# Patient Record
Sex: FEMALE | Race: WHITE | NOT HISPANIC OR LATINO | Employment: FULL TIME | URBAN - METROPOLITAN AREA
[De-identification: names, ages, dates, MRNs, and addresses within clinical notes are randomized per-mention and may not be internally consistent; named-entity substitution may affect disease eponyms.]

---

## 2017-01-24 DIAGNOSIS — Z30.9 ENCOUNTER FOR CONTRACEPTIVE MANAGEMENT, UNSPECIFIED CONTRACEPTIVE ENCOUNTER TYPE: ICD-10-CM

## 2017-01-24 RX ORDER — ESTRADIOL 1 MG/1
TABLET ORAL
Qty: 28 TABLET | Refills: 0 | Status: SHIPPED | OUTPATIENT
Start: 2017-01-24 | End: 2017-02-23 | Stop reason: SDUPTHER

## 2017-02-23 DIAGNOSIS — Z30.9 ENCOUNTER FOR CONTRACEPTIVE MANAGEMENT, UNSPECIFIED CONTRACEPTIVE ENCOUNTER TYPE: ICD-10-CM

## 2017-02-24 RX ORDER — ESTRADIOL 1 MG/1
TABLET ORAL
Qty: 28 TABLET | Refills: 0 | Status: SHIPPED | OUTPATIENT
Start: 2017-02-24 | End: 2017-03-08 | Stop reason: SDUPTHER

## 2017-03-08 ENCOUNTER — OFFICE VISIT (OUTPATIENT)
Dept: OBSTETRICS AND GYNECOLOGY | Facility: CLINIC | Age: 62
End: 2017-03-08
Payer: COMMERCIAL

## 2017-03-08 VITALS
BODY MASS INDEX: 41.9 KG/M2 | DIASTOLIC BLOOD PRESSURE: 84 MMHG | SYSTOLIC BLOOD PRESSURE: 124 MMHG | WEIGHT: 260.69 LBS | HEIGHT: 66 IN

## 2017-03-08 DIAGNOSIS — Z78.0 MENOPAUSE: ICD-10-CM

## 2017-03-08 DIAGNOSIS — Z30.9 ENCOUNTER FOR CONTRACEPTIVE MANAGEMENT, UNSPECIFIED CONTRACEPTIVE ENCOUNTER TYPE: ICD-10-CM

## 2017-03-08 DIAGNOSIS — R82.90 BAD ODOR OF URINE: ICD-10-CM

## 2017-03-08 DIAGNOSIS — Z12.31 VISIT FOR SCREENING MAMMOGRAM: ICD-10-CM

## 2017-03-08 DIAGNOSIS — Z01.419 ENCOUNTER FOR GYNECOLOGICAL EXAMINATION: Primary | ICD-10-CM

## 2017-03-08 LAB
BILIRUB SERPL-MCNC: NORMAL MG/DL
BLOOD URINE, POC: NORMAL
COLOR, POC UA: NORMAL
GLUCOSE UR QL STRIP: NORMAL
KETONES UR QL STRIP: NORMAL
LEUKOCYTE ESTERASE URINE, POC: NORMAL
NITRITE, POC UA: NORMAL
PH, POC UA: 6
PROTEIN, POC: NORMAL
SPECIFIC GRAVITY, POC UA: 1
UROBILINOGEN, POC UA: NORMAL

## 2017-03-08 PROCEDURE — 99999 PR PBB SHADOW E&M-EST. PATIENT-LVL III: CPT | Mod: PBBFAC,,, | Performed by: OBSTETRICS & GYNECOLOGY

## 2017-03-08 PROCEDURE — 87086 URINE CULTURE/COLONY COUNT: CPT

## 2017-03-08 PROCEDURE — 99396 PREV VISIT EST AGE 40-64: CPT | Mod: 25,S$GLB,, | Performed by: OBSTETRICS & GYNECOLOGY

## 2017-03-08 PROCEDURE — 81002 URINALYSIS NONAUTO W/O SCOPE: CPT | Mod: S$GLB,,, | Performed by: OBSTETRICS & GYNECOLOGY

## 2017-03-08 RX ORDER — ESTRADIOL 1 MG/1
1 TABLET ORAL DAILY
Qty: 84 TABLET | Refills: 3 | Status: SHIPPED | OUTPATIENT
Start: 2017-03-08 | End: 2018-02-23 | Stop reason: SDUPTHER

## 2017-03-08 RX ORDER — FUROSEMIDE 20 MG/1
TABLET ORAL
Refills: 0 | COMMUNITY
Start: 2017-01-10

## 2017-03-08 RX ORDER — POTASSIUM CHLORIDE 750 MG/1
CAPSULE, EXTENDED RELEASE ORAL
Refills: 0 | COMMUNITY
Start: 2017-01-16

## 2017-03-08 RX ORDER — ALBUTEROL SULFATE 90 UG/1
AEROSOL, METERED RESPIRATORY (INHALATION)
Refills: 0 | COMMUNITY
Start: 2016-12-27 | End: 2020-11-04

## 2017-03-08 RX ORDER — LOSARTAN POTASSIUM 50 MG/1
50 TABLET ORAL DAILY
Refills: 0 | COMMUNITY
Start: 2016-12-27 | End: 2018-03-21

## 2017-03-08 RX ORDER — ESTRADIOL 1 MG/1
TABLET ORAL
COMMUNITY
Start: 2016-01-05 | End: 2017-03-08 | Stop reason: SDUPTHER

## 2017-03-08 RX ORDER — LEVOTHYROXINE SODIUM 100 UG/1
TABLET ORAL
COMMUNITY
Start: 2016-01-26 | End: 2020-11-04

## 2017-03-08 RX ORDER — TRAZODONE HYDROCHLORIDE 50 MG/1
TABLET ORAL
Refills: 0 | COMMUNITY
Start: 2017-01-10 | End: 2022-02-08

## 2017-03-08 RX ORDER — ATORVASTATIN CALCIUM 20 MG/1
20 TABLET, FILM COATED ORAL NIGHTLY
Refills: 0 | COMMUNITY
Start: 2017-01-10 | End: 2022-02-08

## 2017-03-08 RX ORDER — BUDESONIDE AND FORMOTEROL FUMARATE DIHYDRATE 160; 4.5 UG/1; UG/1
AEROSOL RESPIRATORY (INHALATION)
Refills: 1 | COMMUNITY
Start: 2017-02-20 | End: 2019-09-18 | Stop reason: SDUPTHER

## 2017-03-08 RX ORDER — LORAZEPAM 1 MG/1
TABLET ORAL
Refills: 0 | COMMUNITY
Start: 2017-02-23

## 2017-03-08 RX ORDER — PANTOPRAZOLE SODIUM 40 MG/1
40 TABLET, DELAYED RELEASE ORAL DAILY
Refills: 0 | COMMUNITY
Start: 2017-02-23

## 2017-03-08 RX ORDER — ESCITALOPRAM OXALATE 10 MG/1
10 TABLET ORAL NIGHTLY
Refills: 0 | COMMUNITY
Start: 2017-02-20 | End: 2022-02-08

## 2017-03-08 RX ORDER — MONTELUKAST SODIUM 10 MG/1
10 TABLET ORAL DAILY
Refills: 0 | COMMUNITY
Start: 2017-01-30

## 2017-03-08 NOTE — PROGRESS NOTES
Subjective:       Patient ID: Madhavi Berg is a 61 y.o. female.    Chief Complaint:  Well Woman (last pap and HPV negative 11-3-14, TVH, last mammogram wnl 16, last DEXA osteopenia 13, pt c/o strong odor in urine but denies burning with urination)      History of Present Illness.  Madhavi Berg is a 61 y.o. female.  She has no breast symptoms, postcoital bleeding, pelvic pain or vaginal discharge.  She complains of malodorous urine with LOF with valsalva.  She is getting ready to have left knee surgery again    GYN & OB History  No LMP recorded. Patient has had a hysterectomy.   Pap:  Normal HPV negative  Mammogram: 16 Normal  Colonoscopy:  Normal  DEXA:  hip -1.16 spine normal    OB History    Para Term  AB SAB TAB Ectopic Multiple Living   3 3 3       3      # Outcome Date GA Lbr Rex/2nd Weight Sex Delivery Anes PTL Lv   3 Term 81 40w0d  4.082 kg (9 lb) M Vag-Spont   Y   2 Term 06/10/77 40w0d  3.856 kg (8 lb 8 oz) M Vag-Spont   Y   1 Term 72 40w0d  3.969 kg (8 lb 12 oz) M Vag-Spont   Y          Past Medical History:   Diagnosis Date    Esophageal reflux     Fibrocystic breast disease     History of anxiety disorder     Hyperlipidemia     IBS (irritable bowel syndrome)     Osteopenia      Past Surgical History:   Procedure Laterality Date    BREAST LUMPECTOMY  5238-2619    1 lump from right and 2 lumps from left breast, benign     dexa      osteopenia    HYSTERECTOMY      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    NECK SURGERY  1985    partial thyroidectomy    OOPHORECTOMY  1981    ORTHOPEDIC SURGERY  6714-7951    right foot x 1 and left foot x 3    TONSILLECTOMY      9years old     Family History   Problem Relation Age of Onset    Throat cancer Father     Hypertension Father     Colon cancer Mother     Lung cancer Sister     Breast cancer Neg Hx     Ovarian cancer Neg Hx     Stroke Neg Hx      Social History    Substance Use Topics    Smoking status: Former Smoker    Smokeless tobacco: None    Alcohol use Yes      Comment: occasional       Current Outpatient Prescriptions:     atorvastatin (LIPITOR) 20 MG tablet, Take 20 mg by mouth every evening., Disp: , Rfl: 0    escitalopram oxalate (LEXAPRO) 10 MG tablet, Take 10 mg by mouth every evening., Disp: , Rfl: 0    estradiol (ESTRACE) 1 MG tablet, Take 1 tablet (1 mg total) by mouth once daily., Disp: 84 tablet, Rfl: 3    furosemide (LASIX) 20 MG tablet, , Disp: , Rfl: 0    levothyroxine (SYNTHROID) 100 MCG tablet, , Disp: , Rfl:     lorazepam (ATIVAN) 1 MG tablet, take 1/2 to 1 tablet by mouth at bedtime if needed, Disp: , Rfl: 0    losartan (COZAAR) 50 MG tablet, Take 50 mg by mouth once daily., Disp: , Rfl: 0    montelukast (SINGULAIR) 10 mg tablet, Take 10 mg by mouth once daily., Disp: , Rfl: 0    multivit-min-FA-Ca carb-vit K (ONE-A-DAY WOMEN'S 50 PLUS) 400 mcg-500 mg calcium-20 mcg Tab, , Disp: , Rfl:     omega-3 fatty acids (FISH OIL) 300 mg Cap, once a day, Disp: , Rfl:     pantoprazole (PROTONIX) 40 MG tablet, Take 40 mg by mouth once daily., Disp: , Rfl: 0    potassium chloride (MICRO-K) 10 MEQ CpSR, , Disp: , Rfl: 0    PROAIR HFA 90 mcg/actuation inhaler, inhale 1 to 2 puffs every 4 to 6 hours if needed, Disp: , Rfl: 0    SYMBICORT 160-4.5 mcg/actuation HFAA, , Disp: , Rfl: 1    trazodone (DESYREL) 50 MG tablet, take 1 to 2 tablets by mouth at bedtime, Disp: , Rfl: 0    Review of patient's allergies indicates:  Allergies not on file    Review of Systems  Review of Systems   Constitutional: Negative for fatigue.   HENT: Negative for trouble swallowing.    Eyes: Negative for visual disturbance.   Respiratory: Negative for cough and shortness of breath.    Cardiovascular: Negative for chest pain.   Gastrointestinal: Negative for abdominal distention, abdominal pain, blood in stool, nausea and vomiting.   Genitourinary: Negative for difficulty  "urinating, dyspareunia, dysuria, flank pain, frequency, hematuria, pelvic pain, urgency, vaginal bleeding, vaginal discharge and vaginal pain.   Musculoskeletal: Negative for arthralgias.   Skin: Negative for rash.   Neurological: Negative for dizziness and headaches.   Psychiatric/Behavioral: Negative for sleep disturbance. The patient is not nervous/anxious.         Objective:     Vitals:    03/08/17 1448   BP: 124/84   Weight: 118.2 kg (260 lb 11.1 oz)   Height: 5' 6" (1.676 m)   PainSc:   8     Body mass index is 42.08 kg/(m^2).    Physical Exam:   Constitutional: She is oriented to person, place, and time. Vital signs are normal. She appears well-developed and well-nourished.    HENT:   Head: Normocephalic.     Neck: Normal range of motion. No thyromegaly present.     Pulmonary/Chest: Right breast exhibits no mass, no nipple discharge, no skin change, no tenderness and no swelling. Left breast exhibits no mass, no nipple discharge, no skin change, no tenderness and no swelling. Breasts are symmetrical.        Abdominal: Soft. Normal appearance and bowel sounds are normal. She exhibits no distension. There is no tenderness.     Genitourinary: Rectum normal and vagina normal. Rectal exam shows guaiac negative stool. Guaiac negative stool. Pelvic exam was performed with patient supine. There is no rash, tenderness, lesion or injury on the right labia. There is no rash, tenderness, lesion or injury on the left labia. Uterus is absent. Right adnexum displays no mass, no tenderness and no fullness. Left adnexum displays no mass, no tenderness and no fullness. No erythema in the vagina. No vaginal discharge found. Cervix exhibits absence.           Musculoskeletal: Normal range of motion.      Lymphadenopathy:        Right: No inguinal and no supraclavicular adenopathy present.        Left: No inguinal and no supraclavicular adenopathy present.    Neurological: She is alert and oriented to person, place, and time.  "   Skin: Skin is warm and dry.    Psychiatric: She has a normal mood and affect.        Assessment/ Plan:     Encounter for gynecological examination    Encounter for contraceptive management, unspecified contraceptive encounter type  -     estradiol (ESTRACE) 1 MG tablet; Take 1 tablet (1 mg total) by mouth once daily.  Dispense: 84 tablet; Refill: 3    Visit for screening mammogram  -     Mammo Digital Screening Bilat with Tomosynthesis CAD; Future; Expected date: 3/8/17    Menopause  -     DXA Bone Density Spine And Hip_Axial Skeleton; Future; Expected date: 3/8/17    Bad odor of urine  -     POCT urine dipstick without microscope        Routine pap smears.  Self breast exam and mammography discussed  Routine colonoscopy discussed.  Diet and exercise discussed at length.  I recommend Medi-Weightloss and explained how it will help her knee issues too.  Recommend calcium 1200 mg and vitamin D 600 units daily and routine bone mineral density testing.  Yearly influenza vaccination discussed.      Follow-up with me in 1 year

## 2017-03-09 LAB — BACTERIA UR CULT: NO GROWTH

## 2017-03-15 ENCOUNTER — TELEPHONE (OUTPATIENT)
Dept: OBSTETRICS AND GYNECOLOGY | Facility: CLINIC | Age: 62
End: 2017-03-15

## 2017-03-15 NOTE — TELEPHONE ENCOUNTER
----- Message from Queta Siegel MD sent at 3/13/2017  9:21 AM CDT -----  Call pt.  Urine culture is normal

## 2017-04-20 ENCOUNTER — HOSPITAL ENCOUNTER (OUTPATIENT)
Dept: RADIOLOGY | Facility: HOSPITAL | Age: 62
Discharge: HOME | End: 2017-04-20
Attending: OBSTETRICS & GYNECOLOGY
Payer: COMMERCIAL

## 2017-04-20 DIAGNOSIS — Z12.31 VISIT FOR SCREENING MAMMOGRAM: ICD-10-CM

## 2017-04-20 PROCEDURE — 77067 SCR MAMMO BI INCL CAD: CPT | Mod: 26,,, | Performed by: RADIOLOGY

## 2017-04-20 PROCEDURE — 77063 BREAST TOMOSYNTHESIS BI: CPT | Mod: 26,,, | Performed by: RADIOLOGY

## 2017-04-20 PROCEDURE — 77067 SCR MAMMO BI INCL CAD: CPT | Mod: TC

## 2017-04-24 ENCOUNTER — TELEPHONE (OUTPATIENT)
Dept: OBSTETRICS AND GYNECOLOGY | Facility: CLINIC | Age: 62
End: 2017-04-24

## 2017-04-24 NOTE — TELEPHONE ENCOUNTER
Called pt and left detailed message that per Dr. Siegel her mammogram was normal and she will repeat it in one year.    ----- Message from Queta Siegel MD sent at 4/24/2017  9:23 AM CDT -----  Call patient.  Mammogram normal.  We will repeat it in 1 year.

## 2017-04-26 ENCOUNTER — TELEPHONE (OUTPATIENT)
Dept: OBSTETRICS AND GYNECOLOGY | Facility: CLINIC | Age: 62
End: 2017-04-26

## 2017-04-26 NOTE — TELEPHONE ENCOUNTER
----- Message from Queta Siegel MD sent at 4/26/2017  9:21 AM CDT -----  Call patient.  Bone density test is normal.  Participate in weight-bearing exercise 2-3 times / week.  Take calcium 1200 mg daily and Vitamin D 600 units daily unless your primary care physician has instructed you not to do so.  Repeat your bone density test in 3-5 years.

## 2017-04-26 NOTE — TELEPHONE ENCOUNTER
Pt returned my call and she was notified of normal Bone Density Results. Per Dr Siegel, she Recommends calcium and vit D. Pt stated she is already on a high dose of Vit D already for a low Vit D level.

## 2018-02-23 DIAGNOSIS — Z30.9 ENCOUNTER FOR CONTRACEPTIVE MANAGEMENT: ICD-10-CM

## 2018-02-26 RX ORDER — ESTRADIOL 1 MG/1
TABLET ORAL
Qty: 84 TABLET | Refills: 0 | Status: SHIPPED | OUTPATIENT
Start: 2018-02-26 | End: 2018-03-21

## 2018-03-21 ENCOUNTER — TELEPHONE (OUTPATIENT)
Dept: OBSTETRICS AND GYNECOLOGY | Facility: CLINIC | Age: 63
End: 2018-03-21

## 2018-03-21 ENCOUNTER — OFFICE VISIT (OUTPATIENT)
Dept: OBSTETRICS AND GYNECOLOGY | Facility: CLINIC | Age: 63
End: 2018-03-21
Payer: COMMERCIAL

## 2018-03-21 VITALS
DIASTOLIC BLOOD PRESSURE: 80 MMHG | BODY MASS INDEX: 41.22 KG/M2 | HEIGHT: 66 IN | WEIGHT: 256.5 LBS | SYSTOLIC BLOOD PRESSURE: 136 MMHG

## 2018-03-21 DIAGNOSIS — Z12.31 VISIT FOR SCREENING MAMMOGRAM: ICD-10-CM

## 2018-03-21 DIAGNOSIS — Z01.411 ENCOUNTER FOR GYNECOLOGICAL EXAMINATION (GENERAL) (ROUTINE) WITH ABNORMAL FINDINGS: Primary | ICD-10-CM

## 2018-03-21 DIAGNOSIS — Z78.0 MENOPAUSE: ICD-10-CM

## 2018-03-21 PROCEDURE — 99396 PREV VISIT EST AGE 40-64: CPT | Mod: S$GLB,,, | Performed by: OBSTETRICS & GYNECOLOGY

## 2018-03-21 PROCEDURE — 99999 PR PBB SHADOW E&M-EST. PATIENT-LVL III: CPT | Mod: PBBFAC,,, | Performed by: OBSTETRICS & GYNECOLOGY

## 2018-03-21 RX ORDER — LOSARTAN POTASSIUM 100 MG/1
TABLET ORAL
COMMUNITY
Start: 2018-02-19 | End: 2019-09-18 | Stop reason: SDUPTHER

## 2018-03-21 RX ORDER — CELECOXIB 200 MG/1
CAPSULE ORAL
COMMUNITY
Start: 2017-12-12 | End: 2019-09-18

## 2018-03-21 RX ORDER — GABAPENTIN 300 MG/1
CAPSULE ORAL
COMMUNITY
Start: 2017-12-12 | End: 2019-09-18

## 2018-03-21 RX ORDER — PROGESTERONE 200 MG/1
200 CAPSULE ORAL DAILY
Qty: 90 CAPSULE | Refills: 3 | Status: SHIPPED | OUTPATIENT
Start: 2018-03-21 | End: 2019-09-18

## 2018-03-21 RX ORDER — ESTRADIOL 2 MG/1
2 TABLET ORAL DAILY
Qty: 90 TABLET | Refills: 3 | Status: SHIPPED | OUTPATIENT
Start: 2018-03-21 | End: 2019-04-15 | Stop reason: SDUPTHER

## 2018-03-21 NOTE — TELEPHONE ENCOUNTER
Pt was seen in office today and was prescribed Progesterone (PROMETRIUM) 200 MG capsule. Pt states that her pharmacy called her to inform her that it would cost her $223 due to her deductible. Pt wants to know if there is a generic or cheaper alternative or if something else can be recommended.

## 2018-03-21 NOTE — TELEPHONE ENCOUNTER
Pt says progesterone that Dr Swing prescribed today is too expensive and wants to know if there is a generic or cheaper alternative.

## 2018-03-21 NOTE — PROGRESS NOTES
Subjective:       Patient ID: Madhavi Berg is a 62 y.o. female.    Chief Complaint:  Well Woman (Annual Exam  --  Last Pap/Hpv 11-3-14 (TVH), Negative  --  Last MMG 17, Normal   --  Colonosocpy , Normal   --  Dexa 17, Osteopenia )      History of Present Illness.  Madhvai Berg is a 62 y.o. female.  She has no breast or urinary symptoms.  She has no postcoital bleeding, pelvic pain or vaginal discharge.  She recently had left knee replacement.  Her right shoulder is also having issues and she may need it replaced.  She also complains of memory issues.    GYN & OB History  No LMP recorded (lmp unknown). Patient has had a hysterectomy.   Pap: No result found  Mammogram: 2017 Normal  Colonoscopy:  Normal  DEXA:  Osteopenia    OB History    Para Term  AB Living   3 3 3     3   SAB TAB Ectopic Multiple Live Births           3      # Outcome Date GA Lbr Rex/2nd Weight Sex Delivery Anes PTL Lv   3 Term 81 40w0d  4.082 kg (9 lb) M Vag-Spont EPI  NOY   2 Term 06/10/77 40w0d  3.856 kg (8 lb 8 oz) M Vag-Spont EPI  NOY   1 Term 73 40w0d  3.969 kg (8 lb 12 oz) M Vag-Spont Gen  NOY      Obstetric Comments   Age at menarche 15       Past Medical History:   Diagnosis Date    Esophageal reflux     Fibrocystic breast disease     History of anxiety disorder     History of bone density study 2017    Osteopenia  (7-30-1, Osteopenia)     Hyperlipidemia     IBS (irritable bowel syndrome)     Osteopenia      Past Surgical History:   Procedure Laterality Date    APPENDECTOMY      BREAST LUMPECTOMY   &     1 lump from right and 2 lumps from left breast, benign     COLONOSCOPY      Diverticulitis  ( Dr Breaux - Q 3yrs)    HYSTERECTOMY      TVH - 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    OOPHORECTOMY Right 1981    ORTHOPEDIC SURGERY  [id number]    right foot x 1 and left foot x 3    THYROIDECTOMY, PARTIAL  1985    Dr Alfie Lennon    TONSILLECTOMY       9years old     Family History   Problem Relation Age of Onset    Throat cancer Father     Hypertension Father     Cancer Father     Colon cancer Mother     Cancer Mother     Lung cancer Sister     Cancer Sister     Breast cancer Neg Hx     Ovarian cancer Neg Hx     Stroke Neg Hx      Social History   Substance Use Topics    Smoking status: Former Smoker     Quit date: 1994    Smokeless tobacco: Never Used    Alcohol use Yes      Comment: occasional       Current Outpatient Prescriptions:     atorvastatin (LIPITOR) 20 MG tablet, Take 20 mg by mouth every evening., Disp: , Rfl: 0    celecoxib (CELEBREX) 200 MG capsule, , Disp: , Rfl:     escitalopram oxalate (LEXAPRO) 10 MG tablet, Take 10 mg by mouth every evening., Disp: , Rfl: 0    furosemide (LASIX) 20 MG tablet, , Disp: , Rfl: 0    gabapentin (NEURONTIN) 300 MG capsule, , Disp: , Rfl:     levothyroxine (SYNTHROID) 100 MCG tablet, , Disp: , Rfl:     lorazepam (ATIVAN) 1 MG tablet, take 1/2 to 1 tablet by mouth at bedtime if needed, Disp: , Rfl: 0    losartan (COZAAR) 100 MG tablet, , Disp: , Rfl:     montelukast (SINGULAIR) 10 mg tablet, Take 10 mg by mouth once daily., Disp: , Rfl: 0    multivit-min-FA-Ca carb-vit K (ONE-A-DAY WOMEN'S 50 PLUS) 400 mcg-500 mg calcium-20 mcg Tab, , Disp: , Rfl:     pantoprazole (PROTONIX) 40 MG tablet, Take 40 mg by mouth once daily., Disp: , Rfl: 0    potassium chloride (MICRO-K) 10 MEQ CpSR, , Disp: , Rfl: 0    PROAIR HFA 90 mcg/actuation inhaler, inhale 1 to 2 puffs every 4 to 6 hours if needed, Disp: , Rfl: 0    SYMBICORT 160-4.5 mcg/actuation HFAA, , Disp: , Rfl: 1    trazodone (DESYREL) 50 MG tablet, take 1 to 2 tablets by mouth at bedtime, Disp: , Rfl: 0    estradiol (ESTRACE) 2 MG tablet, Take 1 tablet (2 mg total) by mouth once daily., Disp: 90 tablet, Rfl: 3    progesterone (PROMETRIUM) 200 MG capsule, Take 1 capsule (200 mg total) by mouth once daily., Disp: 90 capsule, Rfl: 3    Review  "of patient's allergies indicates:   Allergen Reactions    Iodine Hives and Rash    Sulfa (sulfonamide antibiotics) Hives and Rash       Review of Systems  Review of Systems   Constitutional: Negative for fatigue.   HENT: Negative for trouble swallowing.    Eyes: Negative for visual disturbance.   Respiratory: Negative for cough and shortness of breath.    Cardiovascular: Negative for chest pain.   Gastrointestinal: Negative for abdominal distention, abdominal pain, blood in stool, nausea and vomiting.   Genitourinary: Negative for difficulty urinating, dyspareunia, dysuria, flank pain, frequency, hematuria, pelvic pain, urgency, vaginal bleeding, vaginal discharge and vaginal pain.   Musculoskeletal: Negative for arthralgias.   Skin: Negative for rash.   Neurological: Negative for dizziness and headaches.   Psychiatric/Behavioral: Negative for sleep disturbance. The patient is not nervous/anxious.         Objective:     Vitals:    03/21/18 1345   BP: 136/80   Weight: 116.3 kg (256 lb 8.1 oz)   Height: 5' 6" (1.676 m)   PainSc: 0-No pain     Body mass index is 41.4 kg/m².    Physical Exam:   Constitutional: She is oriented to person, place, and time. Vital signs are normal. She appears well-developed and well-nourished.    HENT:   Head: Normocephalic.     Neck: Normal range of motion. No thyromegaly present.     Pulmonary/Chest: Right breast exhibits no mass, no nipple discharge, no skin change, no tenderness and no swelling. Left breast exhibits no mass, no nipple discharge, no skin change, no tenderness and no swelling. Breasts are symmetrical.        Abdominal: Soft. Normal appearance and bowel sounds are normal. She exhibits no distension. There is no tenderness.     Genitourinary: Rectum normal and vagina normal. Rectal exam shows guaiac negative stool. Guaiac negative stool. Pelvic exam was performed with patient supine. There is no rash, tenderness, lesion or injury on the right labia. There is no rash, " tenderness, lesion or injury on the left labia. Uterus is absent. Right adnexum displays no mass, no tenderness and no fullness. Left adnexum displays no mass, no tenderness and no fullness. No erythema in the vagina. No vaginal discharge found. Cervix exhibits absence.           Musculoskeletal: Normal range of motion.      Lymphadenopathy:        Right: No inguinal and no supraclavicular adenopathy present.        Left: No inguinal and no supraclavicular adenopathy present.    Neurological: She is alert and oriented to person, place, and time.    Skin: Skin is warm and dry.    Psychiatric: She has a normal mood and affect.        Assessment/ Plan:     Encounter for gynecological examination (general) (routine) with abnormal findings    Visit for screening mammogram  -     Mammo Digital Screening Bilat with Tomosynthesis CAD; Future; Expected date: 03/21/2018    BMI 40.0-44.9, adult    Menopause  -     estradiol (ESTRACE) 2 MG tablet; Take 1 tablet (2 mg total) by mouth once daily.  Dispense: 90 tablet; Refill: 3  -     progesterone (PROMETRIUM) 200 MG capsule; Take 1 capsule (200 mg total) by mouth once daily.  Dispense: 90 capsule; Refill: 3    BHRT discussed - risks/benefits.  Recommend DHEA 5 mg daily, titrate to 25 mg BID  Recommend pregnenolone  mg QD for memory.  Routine pap smears.  Self breast exam and mammography discussed  Routine colonoscopy discussed.  Diet and exercise discussed.  Recommend calcium 1200 mg and vitamin D 600 units daily and routine bone mineral density testing.  Yearly influenza vaccination discussed.  Follow-up with me in 1 year

## 2018-03-21 NOTE — TELEPHONE ENCOUNTER
Spoke with pt.  She will check [facility] and let me know.  If still too expensive, she wants compounded

## 2018-03-22 ENCOUNTER — TELEPHONE (OUTPATIENT)
Dept: OBSTETRICS AND GYNECOLOGY | Facility: CLINIC | Age: 63
End: 2018-03-22

## 2018-03-22 DIAGNOSIS — Z78.0 MENOPAUSE: ICD-10-CM

## 2018-03-22 RX ORDER — PROGESTERONE 200 MG/1
200 CAPSULE ORAL DAILY
Qty: 90 CAPSULE | Refills: 3 | Status: CANCELLED | OUTPATIENT
Start: 2018-03-22 | End: 2018-06-20

## 2018-03-22 NOTE — TELEPHONE ENCOUNTER
Informed pt that Rx was sent and they will contact her for additional information. Pt verbalized understanding.

## 2018-03-22 NOTE — TELEPHONE ENCOUNTER
Pt calling to let Dr Siegel know that her progesterone is still too expensive at [facility]. Pt would it called in to pharmacy that Dr Siegel suggested.

## 2018-04-25 ENCOUNTER — TELEPHONE (OUTPATIENT)
Dept: OBSTETRICS AND GYNECOLOGY | Facility: CLINIC | Age: 63
End: 2018-04-25

## 2018-04-25 ENCOUNTER — APPOINTMENT (OUTPATIENT)
Dept: RADIOLOGY | Facility: OTHER | Age: 63
End: 2018-04-25
Attending: OBSTETRICS & GYNECOLOGY
Payer: COMMERCIAL

## 2018-04-25 VITALS — BODY MASS INDEX: 41.14 KG/M2 | WEIGHT: 256 LBS | HEIGHT: 66 IN

## 2018-04-25 DIAGNOSIS — Z12.31 VISIT FOR SCREENING MAMMOGRAM: ICD-10-CM

## 2018-04-25 PROCEDURE — 77063 BREAST TOMOSYNTHESIS BI: CPT | Mod: 26,,, | Performed by: RADIOLOGY

## 2018-04-25 PROCEDURE — 77067 SCR MAMMO BI INCL CAD: CPT | Mod: TC,PN

## 2018-04-25 PROCEDURE — 77067 SCR MAMMO BI INCL CAD: CPT | Mod: 26,,, | Performed by: RADIOLOGY

## 2018-04-25 NOTE — TELEPHONE ENCOUNTER
----- Message from Queta Siegel MD sent at 4/25/2018  4:52 PM CDT -----  Call patient.  Mammogram normal.  We will repeat it in 1 year.

## 2019-04-15 DIAGNOSIS — Z78.0 MENOPAUSE: ICD-10-CM

## 2019-04-15 RX ORDER — ESTRADIOL 2 MG/1
TABLET ORAL
Qty: 90 TABLET | Refills: 0 | Status: SHIPPED | OUTPATIENT
Start: 2019-04-15 | End: 2019-07-15 | Stop reason: SDUPTHER

## 2019-05-23 ENCOUNTER — TELEPHONE (OUTPATIENT)
Dept: OBSTETRICS AND GYNECOLOGY | Facility: CLINIC | Age: 64
End: 2019-05-23

## 2019-05-23 DIAGNOSIS — Z12.31 ENCOUNTER FOR SCREENING MAMMOGRAM FOR BREAST CANCER: Primary | ICD-10-CM

## 2019-07-15 DIAGNOSIS — Z78.0 MENOPAUSE: ICD-10-CM

## 2019-07-15 RX ORDER — ESTRADIOL 2 MG/1
2 TABLET ORAL DAILY
Qty: 90 TABLET | Refills: 0 | Status: SHIPPED | OUTPATIENT
Start: 2019-07-15 | End: 2019-10-11 | Stop reason: SDUPTHER

## 2019-09-18 ENCOUNTER — TELEPHONE (OUTPATIENT)
Dept: OBSTETRICS AND GYNECOLOGY | Facility: CLINIC | Age: 64
End: 2019-09-18

## 2019-09-18 ENCOUNTER — APPOINTMENT (OUTPATIENT)
Dept: RADIOLOGY | Facility: OTHER | Age: 64
End: 2019-09-18
Attending: OBSTETRICS & GYNECOLOGY
Payer: MEDICARE

## 2019-09-18 ENCOUNTER — OFFICE VISIT (OUTPATIENT)
Dept: OBSTETRICS AND GYNECOLOGY | Facility: CLINIC | Age: 64
End: 2019-09-18
Payer: MEDICARE

## 2019-09-18 VITALS
WEIGHT: 256 LBS | DIASTOLIC BLOOD PRESSURE: 86 MMHG | BODY MASS INDEX: 41.14 KG/M2 | HEIGHT: 66 IN | HEIGHT: 66 IN | BODY MASS INDEX: 43.49 KG/M2 | SYSTOLIC BLOOD PRESSURE: 128 MMHG | WEIGHT: 270.63 LBS

## 2019-09-18 DIAGNOSIS — Z78.0 MENOPAUSE: Primary | ICD-10-CM

## 2019-09-18 DIAGNOSIS — Z01.419 ENCOUNTER FOR GYNECOLOGICAL EXAMINATION: ICD-10-CM

## 2019-09-18 DIAGNOSIS — Z12.31 ENCOUNTER FOR SCREENING MAMMOGRAM FOR BREAST CANCER: ICD-10-CM

## 2019-09-18 PROBLEM — M54.30 SCIATICA: Status: ACTIVE | Noted: 2019-09-18

## 2019-09-18 PROBLEM — E03.9 HYPOTHYROIDISM: Status: ACTIVE | Noted: 2019-09-18

## 2019-09-18 PROBLEM — I10 HYPERTENSION: Status: ACTIVE | Noted: 2019-09-18

## 2019-09-18 PROCEDURE — G0101 CA SCREEN;PELVIC/BREAST EXAM: HCPCS | Mod: S$GLB,,, | Performed by: OBSTETRICS & GYNECOLOGY

## 2019-09-18 PROCEDURE — G0101 PR CA SCREEN;PELVIC/BREAST EXAM: ICD-10-PCS | Mod: S$GLB,,, | Performed by: OBSTETRICS & GYNECOLOGY

## 2019-09-18 PROCEDURE — 77067 SCR MAMMO BI INCL CAD: CPT | Mod: TC,PN

## 2019-09-18 PROCEDURE — 77067 SCR MAMMO BI INCL CAD: CPT | Mod: 26,,, | Performed by: RADIOLOGY

## 2019-09-18 PROCEDURE — 99999 PR PBB SHADOW E&M-EST. PATIENT-LVL IV: CPT | Mod: PBBFAC,,, | Performed by: OBSTETRICS & GYNECOLOGY

## 2019-09-18 PROCEDURE — 99999 PR PBB SHADOW E&M-EST. PATIENT-LVL IV: ICD-10-PCS | Mod: PBBFAC,,, | Performed by: OBSTETRICS & GYNECOLOGY

## 2019-09-18 PROCEDURE — 77063 MAMMO DIGITAL SCREENING BILAT WITH TOMOSYNTHESIS_CAD: ICD-10-PCS | Mod: 26,,, | Performed by: RADIOLOGY

## 2019-09-18 PROCEDURE — 77067 MAMMO DIGITAL SCREENING BILAT WITH TOMOSYNTHESIS_CAD: ICD-10-PCS | Mod: 26,,, | Performed by: RADIOLOGY

## 2019-09-18 PROCEDURE — 99499 UNLISTED E&M SERVICE: CPT | Mod: S$GLB,,, | Performed by: OBSTETRICS & GYNECOLOGY

## 2019-09-18 PROCEDURE — 99499 RISK ADDL DX/OHS AUDIT: ICD-10-PCS | Mod: S$GLB,,, | Performed by: OBSTETRICS & GYNECOLOGY

## 2019-09-18 PROCEDURE — 77063 BREAST TOMOSYNTHESIS BI: CPT | Mod: 26,,, | Performed by: RADIOLOGY

## 2019-09-18 RX ORDER — TRAZODONE HYDROCHLORIDE 50 MG/1
TABLET ORAL
COMMUNITY
Start: 2017-11-08 | End: 2019-09-18 | Stop reason: SDUPTHER

## 2019-09-18 RX ORDER — ESCITALOPRAM OXALATE 10 MG/1
TABLET ORAL
COMMUNITY
Start: 2017-11-08 | End: 2019-09-18 | Stop reason: SDUPTHER

## 2019-09-18 RX ORDER — MONTELUKAST SODIUM 10 MG/1
TABLET ORAL
COMMUNITY
End: 2019-09-18 | Stop reason: SDUPTHER

## 2019-09-18 RX ORDER — POTASSIUM CHLORIDE 750 MG/1
TABLET, EXTENDED RELEASE ORAL
Refills: 0 | COMMUNITY
Start: 2019-07-17 | End: 2019-09-18 | Stop reason: SDUPTHER

## 2019-09-18 RX ORDER — HYDROCHLOROTHIAZIDE 25 MG/1
TABLET ORAL
Refills: 1 | COMMUNITY
Start: 2019-08-07 | End: 2020-11-04

## 2019-09-18 RX ORDER — BUDESONIDE AND FORMOTEROL FUMARATE DIHYDRATE 160; 4.5 UG/1; UG/1
2 AEROSOL RESPIRATORY (INHALATION)
COMMUNITY
Start: 2017-11-08 | End: 2019-09-18 | Stop reason: SDUPTHER

## 2019-09-18 RX ORDER — FLUOROURACIL 50 MG/G
CREAM TOPICAL
COMMUNITY
End: 2019-09-18

## 2019-09-18 RX ORDER — ATORVASTATIN CALCIUM 20 MG/1
TABLET, FILM COATED ORAL
COMMUNITY
Start: 2017-11-08 | End: 2019-09-18 | Stop reason: SDUPTHER

## 2019-09-18 RX ORDER — GABAPENTIN 300 MG/1
CAPSULE ORAL
COMMUNITY
Start: 2017-11-15 | End: 2019-09-18

## 2019-09-18 RX ORDER — METRONIDAZOLE 500 MG/1
TABLET ORAL
COMMUNITY
End: 2019-09-18

## 2019-09-18 RX ORDER — PANTOPRAZOLE SODIUM 40 MG/1
FOR SUSPENSION ORAL
COMMUNITY
Start: 2017-11-08 | End: 2019-09-18 | Stop reason: SDUPTHER

## 2019-09-18 RX ORDER — LOSARTAN POTASSIUM 50 MG/1
TABLET ORAL
COMMUNITY
Start: 2017-11-08 | End: 2022-02-08

## 2019-09-18 RX ORDER — LORAZEPAM 1 MG/1
TABLET ORAL
COMMUNITY
End: 2019-09-18 | Stop reason: SDUPTHER

## 2019-09-18 RX ORDER — LEVOTHYROXINE SODIUM 125 UG/1
TABLET ORAL
COMMUNITY
End: 2019-09-18 | Stop reason: SDUPTHER

## 2019-09-18 RX ORDER — ALBUTEROL SULFATE 90 UG/1
2 AEROSOL, METERED RESPIRATORY (INHALATION)
COMMUNITY
Start: 2017-11-08 | End: 2020-11-04

## 2019-09-18 RX ORDER — AMOXICILLIN 500 MG/1
CAPSULE ORAL
COMMUNITY
End: 2019-09-18

## 2019-09-18 RX ORDER — LEVOTHYROXINE SODIUM 125 UG/1
125 TABLET ORAL DAILY
Refills: 0 | COMMUNITY
Start: 2019-08-22

## 2019-09-18 RX ORDER — HYDROCODONE BITARTRATE AND ACETAMINOPHEN 5; 325 MG/1; MG/1
TABLET ORAL
COMMUNITY
End: 2019-09-18

## 2019-09-18 RX ORDER — BUDESONIDE AND FORMOTEROL FUMARATE DIHYDRATE 160; 4.5 UG/1; UG/1
AEROSOL RESPIRATORY (INHALATION)
COMMUNITY

## 2019-09-18 RX ORDER — MUPIROCIN 20 MG/G
OINTMENT TOPICAL
COMMUNITY
End: 2019-09-18

## 2019-09-18 RX ORDER — PANTOPRAZOLE SODIUM 40 MG/1
TABLET, DELAYED RELEASE ORAL
COMMUNITY
End: 2019-09-18 | Stop reason: SDUPTHER

## 2019-09-18 RX ORDER — LOSARTAN POTASSIUM 100 MG/1
TABLET ORAL
COMMUNITY
End: 2019-09-18 | Stop reason: SDUPTHER

## 2019-09-18 RX ORDER — FLUCONAZOLE 200 MG/1
TABLET ORAL
COMMUNITY
End: 2019-09-18

## 2019-09-18 RX ORDER — SULFAMETHOXAZOLE AND TRIMETHOPRIM 800; 160 MG/1; MG/1
TABLET ORAL
COMMUNITY
End: 2019-09-18

## 2019-09-18 RX ORDER — DOXYCYCLINE 100 MG/1
CAPSULE ORAL
COMMUNITY
End: 2019-09-18

## 2019-09-18 RX ORDER — ACETAMINOPHEN 500 MG
TABLET ORAL
COMMUNITY
Start: 2017-11-15

## 2019-09-18 RX ORDER — GENTAMICIN SULFATE 1 MG/G
OINTMENT TOPICAL
COMMUNITY
End: 2020-11-04

## 2019-09-18 RX ORDER — CELECOXIB 200 MG/1
CAPSULE ORAL
COMMUNITY
Start: 2017-11-15 | End: 2019-09-18

## 2019-09-18 RX ORDER — ESCITALOPRAM OXALATE 20 MG/1
TABLET ORAL
COMMUNITY
End: 2019-09-18

## 2019-09-18 RX ORDER — HYDROCHLOROTHIAZIDE 25 MG/1
TABLET ORAL
COMMUNITY
End: 2019-09-18 | Stop reason: SDUPTHER

## 2019-09-18 NOTE — TELEPHONE ENCOUNTER
Informed pt of results and recommendations. Pt verbalized understanding.       Notes recorded by Queta Siegel MD on 9/18/2019 at 3:40 PM CDT  Call patient.  Mammogram normal.  We will repeat it in 1 year.

## 2019-09-18 NOTE — PROGRESS NOTES
Subjective:       Patient ID: Madhavi Berg is a 63 y.o. female.    Chief Complaint:  Well Woman      History of Present Illness.  Madhavi Berg is a 63 y.o. female.  She has no breast or urinary symptoms.  She has no postcoital bleeding, pelvic pain or vaginal discharge.    GYN & OB History  No LMP recorded (lmp unknown). Patient has had a hysterectomy.   Pap: No result found  Mammogram: 18 Normal  Colonoscopy: 19 polyps  DEXA: 17 Normal    OB History    Para Term  AB Living   3 3 3     3   SAB TAB Ectopic Multiple Live Births           3      # Outcome Date GA Lbr Rex/2nd Weight Sex Delivery Anes PTL Lv   3 Term 80 40w0d  4.082 kg (9 lb) M Vag-Spont EPI  NOY   2 Term 06/10/77 40w0d  3.856 kg (8 lb 8 oz) M Vag-Spont EPI  NOY   1 Term 72 40w0d  3.969 kg (8 lb 12 oz) M Vag-Spont Gen  NOY      Obstetric Comments   Age at menarche 15       Past Medical History:   Diagnosis Date    Breast cyst     Esophageal reflux     Fibrocystic breast disease     History of anxiety disorder     History of bone density study 2017    Osteopenia  (7-30-1, Osteopenia)     Hyperlipidemia     IBS (irritable bowel syndrome)     Osteopenia      Past Surgical History:   Procedure Laterality Date    APPENDECTOMY      BREAST BIOPSY Bilateral     1 right breast; 2 left breast    BREAST CYST ASPIRATION      BREAST CYST EXCISION      COLONOSCOPY      Diverticulitis  ( Dr Breaux - Q 3yrs)    HYSTERECTOMY  1981    [facility] - 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    OOPHORECTOMY Right 1981    ORTHOPEDIC SURGERY  0703-6004    right foot x 1 and left foot x 3    THYROIDECTOMY, PARTIAL  1985    Dr Alfie Lennon    TONSILLECTOMY      9years old     Family History   Problem Relation Age of Onset    Throat cancer Father     Hypertension Father     Cancer Father     Colon cancer Mother     Cancer Mother     Lung cancer Sister     Cancer Sister     Breast cancer Neg Hx      Ovarian cancer Neg Hx     Stroke Neg Hx      Social History     Tobacco Use    Smoking status: Former Smoker     Last attempt to quit:      Years since quittin.7    Smokeless tobacco: Never Used   Substance Use Topics    Alcohol use: Yes     Comment: occasional    Drug use: No       Current Outpatient Medications:     acetaminophen (TYLENOL) 500 MG tablet, Take by mouth., Disp: , Rfl:     albuterol (PROAIR HFA) 90 mcg/actuation inhaler, 2 puffs., Disp: , Rfl:     atorvastatin (LIPITOR) 20 MG tablet, Take 20 mg by mouth every evening., Disp: , Rfl: 0    budesonide-formoterol 160-4.5 mcg (SYMBICORT) 160-4.5 mcg/actuation HFAA, Symbicort 160 mcg-4.5 mcg/actuation HFA aerosol inhaler, Disp: , Rfl:     ergocalciferol, vitamin D2, (VITAMIN D ORAL), Vitamin D, Disp: , Rfl:     escitalopram oxalate (LEXAPRO) 10 MG tablet, Take 10 mg by mouth every evening., Disp: , Rfl: 0    estradiol (ESTRACE) 2 MG tablet, Take 1 tablet (2 mg total) by mouth once daily. You are due for an annual exam.  Please call to schedule an appointment.  Thanks!, Disp: 90 tablet, Rfl: 0    furosemide (LASIX) 20 MG tablet, , Disp: , Rfl: 0    hydroCHLOROthiazide (HYDRODIURIL) 25 MG tablet, TAKE 1 TABLET BY MOUTH ONCE DAILY IF NEEDED FOR EDEMA, Disp: , Rfl: 1    levothyroxine (SYNTHROID) 100 MCG tablet, , Disp: , Rfl:     lorazepam (ATIVAN) 1 MG tablet, take 1/2 to 1 tablet by mouth at bedtime if needed, Disp: , Rfl: 0    losartan (COZAAR) 50 MG tablet, losartan 50 mg tablet, Disp: , Rfl:     montelukast (SINGULAIR) 10 mg tablet, Take 10 mg by mouth once daily., Disp: , Rfl: 0    multivit-min-FA-Ca carb-vit K (ONE-A-DAY WOMEN'S 50 PLUS) 400 mcg-500 mg calcium-20 mcg Tab, , Disp: , Rfl:     pantoprazole (PROTONIX) 40 MG tablet, Take 40 mg by mouth once daily., Disp: , Rfl: 0    potassium chloride (MICRO-K) 10 MEQ CpSR, , Disp: , Rfl: 0    PROAIR HFA 90 mcg/actuation inhaler, inhale 1 to 2 puffs every 4 to 6 hours if needed,  "Disp: , Rfl: 0    trazodone (DESYREL) 50 MG tablet, take 1 to 2 tablets by mouth at bedtime, Disp: , Rfl: 0    gentamicin (GARAMYCIN) 0.1 % ointment, gentamicin 0.1 % topical ointment  DWAYNE AA BID, Disp: , Rfl:     levothyroxine (SYNTHROID) 125 MCG tablet, Take 125 mcg by mouth once daily., Disp: , Rfl: 0    Review of patient's allergies indicates:   Allergen Reactions    Iodine Hives and Rash    Sulfa (sulfonamide antibiotics) Hives and Rash       Review of Systems  Review of Systems   Constitutional: Negative for fatigue.   HENT: Negative for trouble swallowing.    Eyes: Negative for visual disturbance.   Respiratory: Negative for cough and shortness of breath.    Cardiovascular: Negative for chest pain.   Gastrointestinal: Negative for abdominal distention, abdominal pain, blood in stool, nausea and vomiting.   Genitourinary: Negative for difficulty urinating, dyspareunia, dysuria, flank pain, frequency, hematuria, pelvic pain, urgency, vaginal bleeding, vaginal discharge and vaginal pain.   Musculoskeletal: Negative for arthralgias.   Skin: Negative for rash.   Neurological: Negative for dizziness and headaches.   Psychiatric/Behavioral: Negative for sleep disturbance. The patient is not nervous/anxious.         Objective:     Vitals:    09/18/19 1423   BP: 128/86   Weight: 122.7 kg (270 lb 9.8 oz)   Height: 5' 6" (1.676 m)   PainSc: 0-No pain     Body mass index is 43.68 kg/m².    Physical Exam:   Constitutional: She is oriented to person, place, and time. Vital signs are normal. She appears well-developed and well-nourished.    HENT:   Head: Normocephalic.     Neck: Normal range of motion. No thyromegaly present.     Pulmonary/Chest: Right breast exhibits no mass, no nipple discharge, no skin change, no tenderness and no swelling. Left breast exhibits no mass, no nipple discharge, no skin change, no tenderness and no swelling. Breasts are symmetrical.        Abdominal: Soft. Normal appearance and bowel " sounds are normal. She exhibits no distension. There is no tenderness.     Genitourinary: Rectum normal and vagina normal. Rectal exam shows guaiac negative stool. Guaiac negative stool. Pelvic exam was performed with patient supine. There is no rash, tenderness, lesion or injury on the right labia. There is no rash, tenderness, lesion or injury on the left labia. Uterus is absent. Right adnexum displays no mass, no tenderness and no fullness. Left adnexum displays no mass, no tenderness and no fullness. No erythema in the vagina. No vaginal discharge found. Cervix exhibits absence.           Musculoskeletal: Normal range of motion.      Lymphadenopathy:        Right: No inguinal and no supraclavicular adenopathy present.        Left: No inguinal and no supraclavicular adenopathy present.    Neurological: She is alert and oriented to person, place, and time.    Skin: Skin is warm and dry.    Psychiatric: She has a normal mood and affect.        Assessment/ Plan:     Menopause  -     DXA Bone Density Spine And Hip; Future; Expected date: 09/18/2019    Encounter for gynecological examination        Routine pap smears.  Self breast exam and mammography discussed  Routine colonoscopy discussed.  Diet and exercise discussed.  Recommend calcium 1200 mg and vitamin D 1000 units daily and routine bone mineral density testing.  Yearly influenza vaccination discussed.  Follow-up with me in 1 year

## 2019-09-18 NOTE — PROGRESS NOTES
Patient, Madhavi Berg (MRN #[id number]), presented with a recorded BMI of 43.68 kg/m^2 consistent with the definition of morbid obesity (ICD-10 E66.01). The patient's morbid obesity was monitored, evaluated, addressed and/or treated. This addendum to the medical record is made on 09/18/2019.

## 2019-10-10 DIAGNOSIS — Z78.0 MENOPAUSE: ICD-10-CM

## 2019-10-11 RX ORDER — ESTRADIOL 2 MG/1
TABLET ORAL
Qty: 90 TABLET | Refills: 0 | Status: SHIPPED | OUTPATIENT
Start: 2019-10-11 | End: 2019-12-30

## 2019-12-28 DIAGNOSIS — Z78.0 MENOPAUSE: ICD-10-CM

## 2019-12-30 RX ORDER — ESTRADIOL 2 MG/1
TABLET ORAL
Qty: 90 TABLET | Refills: 2 | Status: SHIPPED | OUTPATIENT
Start: 2019-12-30 | End: 2020-09-22 | Stop reason: SDUPTHER

## 2020-03-03 ENCOUNTER — TELEPHONE (OUTPATIENT)
Dept: OBSTETRICS AND GYNECOLOGY | Facility: CLINIC | Age: 65
End: 2020-03-03

## 2020-03-03 DIAGNOSIS — Z12.31 ENCOUNTER FOR SCREENING MAMMOGRAM FOR MALIGNANT NEOPLASM OF BREAST: Primary | ICD-10-CM

## 2020-06-19 ENCOUNTER — TELEPHONE (OUTPATIENT)
Dept: OBSTETRICS AND GYNECOLOGY | Facility: CLINIC | Age: 65
End: 2020-06-19

## 2020-06-19 NOTE — TELEPHONE ENCOUNTER
Spoke with pt and advised her to reach out to her ortho that did the surgery or her pcp.    Pt verbalized understanding.

## 2020-09-22 DIAGNOSIS — Z78.0 MENOPAUSE: ICD-10-CM

## 2020-09-23 NOTE — TELEPHONE ENCOUNTER
Rimma CASTELLON Staff Yesterday (12:16 PM)     Pharm faxed request, has appt 11/04/20.    Routing comment       [id number] - [place], [place] - [address] AT [facility] OF [facility] & [facility] [phone]  Rimma Garcia Yesterday (12:15 PM     Pt scheduled for annual on 11/4 please review and sign

## 2020-09-24 RX ORDER — ESTRADIOL 2 MG/1
2 TABLET ORAL DAILY
Qty: 90 TABLET | Refills: 0 | Status: SHIPPED | OUTPATIENT
Start: 2020-09-24 | End: 2020-11-04 | Stop reason: SDUPTHER

## 2020-10-14 ENCOUNTER — TELEPHONE (OUTPATIENT)
Dept: OBSTETRICS AND GYNECOLOGY | Facility: CLINIC | Age: 65
End: 2020-10-14

## 2020-10-14 ENCOUNTER — TELEPHONE (OUTPATIENT)
Dept: UROLOGY | Facility: CLINIC | Age: 65
End: 2020-10-14

## 2020-10-14 NOTE — TELEPHONE ENCOUNTER
----- Message from Darlene Angeles sent at 10/14/2020 11:57 AM CDT -----  Contact: [phone]/Self  Type:  Sooner Appointment Request     Caller is requesting a sooner appointment.  Caller declined first available appointment listed below.  Caller will not accept being placed on the waitlist and is requesting a message be sent to doctor.  Name of Caller: pt  When is the first available appointment? 10-26-20  Symptoms or Reason: Bladder   Would the patient rather a call back or a response via [facility]? Call back  Best Call Back Number: [phone]  Additional Information: Pt wants to come in on 10-19-20 if possible

## 2020-10-14 NOTE — TELEPHONE ENCOUNTER
Pt reports urinary incontinence since around May after she fell which caused nerve damage in her legs.  She had surgery in September.  Had burning in urethra for a while after having catheter.  Recommended she drop off urine sample to r/o UTI.  States she really doesn't think its a UTI; not having s/s of one.  Requesting a recommendation on who she should see.  She has 1 ovary, h/o hysterectomy.  Gave her Dr. Fatmata Liu's name/ number who is a urologist in [place].  She wanted a female and did not want to go to main campus as she is still using a walker.      Has annual with Dr. Siegel 11/4

## 2020-10-19 ENCOUNTER — OFFICE VISIT (OUTPATIENT)
Dept: UROLOGY | Facility: CLINIC | Age: 65
End: 2020-10-19
Payer: MEDICARE

## 2020-10-19 ENCOUNTER — LAB VISIT (OUTPATIENT)
Dept: LAB | Facility: HOSPITAL | Age: 65
End: 2020-10-19
Attending: NURSE PRACTITIONER
Payer: MEDICARE

## 2020-10-19 VITALS
BODY MASS INDEX: 44.2 KG/M2 | HEIGHT: 66 IN | TEMPERATURE: 98 F | HEART RATE: 73 BPM | SYSTOLIC BLOOD PRESSURE: 122 MMHG | WEIGHT: 275 LBS | DIASTOLIC BLOOD PRESSURE: 73 MMHG

## 2020-10-19 DIAGNOSIS — R39.15 URINARY URGENCY: ICD-10-CM

## 2020-10-19 DIAGNOSIS — R35.0 URINARY FREQUENCY: ICD-10-CM

## 2020-10-19 DIAGNOSIS — N39.41 URINARY INCONTINENCE, URGE: Primary | ICD-10-CM

## 2020-10-19 LAB
ANION GAP SERPL CALC-SCNC: 8 MMOL/L (ref 8–16)
BUN SERPL-MCNC: 19 MG/DL (ref 8–23)
CALCIUM SERPL-MCNC: 9.4 MG/DL (ref 8.7–10.5)
CHLORIDE SERPL-SCNC: 105 MMOL/L (ref 95–110)
CO2 SERPL-SCNC: 26 MMOL/L (ref 23–29)
CREAT SERPL-MCNC: 1 MG/DL (ref 0.5–1.4)
EST. GFR  (AFRICAN AMERICAN): >60 ML/MIN/1.73 M^2
EST. GFR  (NON AFRICAN AMERICAN): 59 ML/MIN/1.73 M^2
GLUCOSE SERPL-MCNC: 109 MG/DL (ref 70–110)
POTASSIUM SERPL-SCNC: 4.5 MMOL/L (ref 3.5–5.1)
SODIUM SERPL-SCNC: 139 MMOL/L (ref 136–145)

## 2020-10-19 PROCEDURE — 99999 PR PBB SHADOW E&M-EST. PATIENT-LVL V: CPT | Mod: PBBFAC,,, | Performed by: NURSE PRACTITIONER

## 2020-10-19 PROCEDURE — 3008F BODY MASS INDEX DOCD: CPT | Mod: CPTII,S$GLB,, | Performed by: NURSE PRACTITIONER

## 2020-10-19 PROCEDURE — 99204 OFFICE O/P NEW MOD 45 MIN: CPT | Mod: S$GLB,,, | Performed by: NURSE PRACTITIONER

## 2020-10-19 PROCEDURE — 80048 BASIC METABOLIC PNL TOTAL CA: CPT

## 2020-10-19 PROCEDURE — 99204 PR OFFICE/OUTPT VISIT, NEW, LEVL IV, 45-59 MIN: ICD-10-PCS | Mod: S$GLB,,, | Performed by: NURSE PRACTITIONER

## 2020-10-19 PROCEDURE — 1101F PR PT FALLS ASSESS DOC 0-1 FALLS W/OUT INJ PAST YR: ICD-10-PCS | Mod: CPTII,S$GLB,, | Performed by: NURSE PRACTITIONER

## 2020-10-19 PROCEDURE — 99999 PR PBB SHADOW E&M-EST. PATIENT-LVL V: ICD-10-PCS | Mod: PBBFAC,,, | Performed by: NURSE PRACTITIONER

## 2020-10-19 PROCEDURE — 1126F PR PAIN SEVERITY QUANTIFIED, NO PAIN PRESENT: ICD-10-PCS | Mod: S$GLB,,, | Performed by: NURSE PRACTITIONER

## 2020-10-19 PROCEDURE — 87086 URINE CULTURE/COLONY COUNT: CPT

## 2020-10-19 PROCEDURE — 1159F MED LIST DOCD IN RCRD: CPT | Mod: S$GLB,,, | Performed by: NURSE PRACTITIONER

## 2020-10-19 PROCEDURE — 3008F PR BODY MASS INDEX (BMI) DOCUMENTED: ICD-10-PCS | Mod: CPTII,S$GLB,, | Performed by: NURSE PRACTITIONER

## 2020-10-19 PROCEDURE — 1101F PT FALLS ASSESS-DOCD LE1/YR: CPT | Mod: CPTII,S$GLB,, | Performed by: NURSE PRACTITIONER

## 2020-10-19 PROCEDURE — 36415 COLL VENOUS BLD VENIPUNCTURE: CPT

## 2020-10-19 PROCEDURE — 1159F PR MEDICATION LIST DOCUMENTED IN MEDICAL RECORD: ICD-10-PCS | Mod: S$GLB,,, | Performed by: NURSE PRACTITIONER

## 2020-10-19 PROCEDURE — 1126F AMNT PAIN NOTED NONE PRSNT: CPT | Mod: S$GLB,,, | Performed by: NURSE PRACTITIONER

## 2020-10-19 PROCEDURE — 81001 URINALYSIS AUTO W/SCOPE: CPT

## 2020-10-19 RX ORDER — MIRABEGRON 25 MG/1
25 TABLET, FILM COATED, EXTENDED RELEASE ORAL DAILY
Qty: 30 TABLET | Refills: 2 | Status: SHIPPED | OUTPATIENT
Start: 2020-10-19 | End: 2020-11-04

## 2020-10-19 NOTE — PROGRESS NOTES
Subjective:       Patient ID: Madhavi Berg is a 65 y.o. female.    Chief Complaint: Other (incontinence )     This is a 65 y.o.  female patient that is new to me.  The patient is self referred  For urge incontinence. She has been experiencing symptoms for several months but reports symptoms seem to be worse after having 2 surgeries in may. She did have catheters both times. Was dealing with constipation related to narcotic use. She is changing pads throughout the day. Nocturia 2-3x. Urine dip: +protein, bili. PVR: 0ml.     Dysuria- no   UTIs- not in a while   Hematuria- no   Kidney stones- no   Hydration- water 8 glasses, 1 diet coke. 7up for lunch. Sleep at 10-11. Dinner at 6-7pm. Water with dinner  BM- IBS. Mixed of both.         Lab Results   Component Value Date    CREATININE 0.7 07/03/2012     ---  Past Medical History:   Diagnosis Date    Breast cyst     Esophageal reflux     Fibrocystic breast disease     History of anxiety disorder     History of bone density study 04/20/2017    Osteopenia  (7-30-1, Osteopenia)     Hyperlipidemia     IBS (irritable bowel syndrome)     Osteopenia        Past Surgical History:   Procedure Laterality Date    APPENDECTOMY  1981    BREAST BIOPSY Bilateral     1 right breast; 2 left breast    BREAST CYST ASPIRATION      BREAST CYST EXCISION      COLONOSCOPY  2015    Diverticulitis  ( Dr Breaux - Q 3yrs)    HYSTERECTOMY  1981    [facility] - 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    OOPHORECTOMY Right 1981    ORTHOPEDIC SURGERY  [id number]    right foot x 1 and left foot x 3    THYROIDECTOMY, PARTIAL  1985    Dr Alfie Lennon    TONSILLECTOMY      9years old       Family History   Problem Relation Age of Onset    Throat cancer Father     Hypertension Father     Cancer Father     Colon cancer Mother     Cancer Mother     Lung cancer Sister     Cancer Sister     Breast cancer Neg Hx     Ovarian cancer Neg Hx     Stroke Neg Hx        Social History     Tobacco  Use    Smoking status: Former Smoker     Quit date:      Years since quittin.8    Smokeless tobacco: Never Used   Substance Use Topics    Alcohol use: Yes     Comment: occasional    Drug use: No       Current Outpatient Medications on File Prior to Visit   Medication Sig Dispense Refill    acetaminophen (TYLENOL) 500 MG tablet Take by mouth.      albuterol (PROAIR HFA) 90 mcg/actuation inhaler 2 puffs.      atorvastatin (LIPITOR) 20 MG tablet Take 20 mg by mouth every evening.  0    budesonide-formoterol 160-4.5 mcg (SYMBICORT) 160-4.5 mcg/actuation HFAA Symbicort 160 mcg-4.5 mcg/actuation HFA aerosol inhaler      ergocalciferol, vitamin D2, (VITAMIN D ORAL) Vitamin D      escitalopram oxalate (LEXAPRO) 10 MG tablet Take 10 mg by mouth every evening.  0    estradioL (ESTRACE) 2 MG tablet Take 1 tablet (2 mg total) by mouth once daily. You are due for an annual exam.  Please call to schedule an appointment.  Thanks! 90 tablet 0    furosemide (LASIX) 20 MG tablet   0    gentamicin (GARAMYCIN) 0.1 % ointment gentamicin 0.1 % topical ointment   DWAYNE AA BID      hydroCHLOROthiazide (HYDRODIURIL) 25 MG tablet TAKE 1 TABLET BY MOUTH ONCE DAILY IF NEEDED FOR EDEMA  1    levothyroxine (SYNTHROID) 125 MCG tablet Take 125 mcg by mouth once daily.  0    lorazepam (ATIVAN) 1 MG tablet take 1/2 to 1 tablet by mouth at bedtime if needed  0    losartan (COZAAR) 50 MG tablet losartan 50 mg tablet      montelukast (SINGULAIR) 10 mg tablet Take 10 mg by mouth once daily.  0    multivit-min-FA-Ca carb-vit K (ONE-A-DAY WOMEN'S 50 PLUS) 400 mcg-500 mg calcium-20 mcg Tab       pantoprazole (PROTONIX) 40 MG tablet Take 40 mg by mouth once daily.  0    potassium chloride (MICRO-K) 10 MEQ CpSR   0    PROAIR HFA 90 mcg/actuation inhaler inhale 1 to 2 puffs every 4 to 6 hours if needed  0    trazodone (DESYREL) 50 MG tablet take 1 to 2 tablets by mouth at bedtime  0    levothyroxine (SYNTHROID) 100 MCG tablet         No current facility-administered medications on file prior to visit.        Review of patient's allergies indicates:   Allergen Reactions    Iodine Hives and Rash    Sulfa (sulfonamide antibiotics) Hives and Rash       Review of Systems   Constitutional: Negative for activity change.   HENT: Negative for congestion.    Eyes: Negative for visual disturbance.   Respiratory: Negative for shortness of breath.    Cardiovascular: Negative for chest pain.   Gastrointestinal: Negative for abdominal distention.   Genitourinary: Positive for frequency and urgency. Negative for difficulty urinating, dysuria and hematuria.   Musculoskeletal: Negative for gait problem.   Skin: Negative for color change.   Neurological: Negative for dizziness.   Psychiatric/Behavioral: Negative for agitation.       Objective:      Physical Exam  Constitutional:       Appearance: She is well-developed.   HENT:      Head: Normocephalic and atraumatic.   Neck:      Musculoskeletal: Normal range of motion.   Pulmonary:      Effort: Pulmonary effort is normal.   Abdominal:      General: There is no distension.      Palpations: Abdomen is soft.      Tenderness: There is no abdominal tenderness.   Musculoskeletal:      Comments: Using walker   Skin:     General: Skin is warm and dry.   Neurological:      Mental Status: She is alert and oriented to person, place, and time.   Psychiatric:         Mood and Affect: Mood normal.         Assessment:       1. Urinary incontinence, urge    2. Urinary urgency    3. Urinary frequency        Plan:         - Patient is emptying well. She is interested in trying OAB medication. Discussed SE. Needs BMP prior to starting. Urine for culture. She will let me know if she starts developing any constipation.   - Avoid bladder irritants including but not limited to caffeine, alcohol, smoking, spicy foods, acidic foods, tomato-based products, citrus, artificial sweeteners, chocolate, coffee or tea. Limit fluids 3 hours  before bed. Elevate legs prior to bedtime. Discussed that lasix can make symptoms worse.   - Follow-up in 6 weeks for symptom and PVR check or sooner if any issues.       Urinary incontinence, urge  -     mirabegron (MYRBETRIQ) 25 mg Tb24 ER tablet; Take 1 tablet (25 mg total) by mouth once daily.  Dispense: 30 tablet; Refill: 2    Urinary urgency  -     Urinalysis Microscopic  -     Urine culture  -     Urinalysis  -     Basic Metabolic Panel; Future; Expected date: 10/19/2020    Urinary frequency

## 2020-10-19 NOTE — PATIENT INSTRUCTIONS
- Avoid bladder irritants including but not limited to caffeine, alcohol, smoking, spicy foods, acidic foods, tomato-based products, citrus, artificial sweeteners, chocolate, coffee or tea. Limit fluids 3 hours before bed.  - Elevate legs before bedtime

## 2020-10-20 LAB
BACTERIA #/AREA URNS AUTO: ABNORMAL /HPF
BILIRUB UR QL STRIP: NEGATIVE
CLARITY UR REFRACT.AUTO: ABNORMAL
COLOR UR AUTO: ABNORMAL
GLUCOSE UR QL STRIP: NEGATIVE
HGB UR QL STRIP: NEGATIVE
HYALINE CASTS UR QL AUTO: 7 /LPF
KETONES UR QL STRIP: NEGATIVE
LEUKOCYTE ESTERASE UR QL STRIP: NEGATIVE
MICROSCOPIC COMMENT: ABNORMAL
NITRITE UR QL STRIP: NEGATIVE
PH UR STRIP: 5 [PH] (ref 5–8)
PROT UR QL STRIP: ABNORMAL
RBC #/AREA URNS AUTO: 0 /HPF (ref 0–4)
SP GR UR STRIP: >=1.03 (ref 1–1.03)
SQUAMOUS #/AREA URNS AUTO: 3 /HPF
URN SPEC COLLECT METH UR: ABNORMAL
WBC #/AREA URNS AUTO: 1 /HPF (ref 0–5)

## 2020-10-21 ENCOUNTER — TELEPHONE (OUTPATIENT)
Dept: UROLOGY | Facility: CLINIC | Age: 65
End: 2020-10-21

## 2020-10-21 LAB — BACTERIA UR CULT: NO GROWTH

## 2020-10-21 NOTE — TELEPHONE ENCOUNTER
----- Message from Queta Hammond NP sent at 10/21/2020  8:39 AM CDT -----  Please let patient know lab work looked good and culture was negative. Thank you

## 2020-11-04 ENCOUNTER — APPOINTMENT (OUTPATIENT)
Dept: RADIOLOGY | Facility: OTHER | Age: 65
End: 2020-11-04
Attending: OBSTETRICS & GYNECOLOGY
Payer: MEDICARE

## 2020-11-04 ENCOUNTER — TELEPHONE (OUTPATIENT)
Dept: UROLOGY | Facility: CLINIC | Age: 65
End: 2020-11-04

## 2020-11-04 ENCOUNTER — OFFICE VISIT (OUTPATIENT)
Dept: OBSTETRICS AND GYNECOLOGY | Facility: CLINIC | Age: 65
End: 2020-11-04
Attending: OBSTETRICS & GYNECOLOGY
Payer: MEDICARE

## 2020-11-04 VITALS
SYSTOLIC BLOOD PRESSURE: 124 MMHG | WEIGHT: 278.75 LBS | BODY MASS INDEX: 44.8 KG/M2 | DIASTOLIC BLOOD PRESSURE: 84 MMHG | HEIGHT: 66 IN

## 2020-11-04 VITALS — WEIGHT: 274.94 LBS | HEIGHT: 66 IN | BODY MASS INDEX: 44.19 KG/M2

## 2020-11-04 DIAGNOSIS — Z01.419 ENCOUNTER FOR GYNECOLOGICAL EXAMINATION: Primary | ICD-10-CM

## 2020-11-04 DIAGNOSIS — R39.15 URGENCY OF URINATION: ICD-10-CM

## 2020-11-04 DIAGNOSIS — Z12.31 ENCOUNTER FOR SCREENING MAMMOGRAM FOR MALIGNANT NEOPLASM OF BREAST: ICD-10-CM

## 2020-11-04 DIAGNOSIS — Z78.0 MENOPAUSE: ICD-10-CM

## 2020-11-04 DIAGNOSIS — Z12.31 VISIT FOR SCREENING MAMMOGRAM: ICD-10-CM

## 2020-11-04 PROCEDURE — 99999 PR PBB SHADOW E&M-EST. PATIENT-LVL IV: ICD-10-PCS | Mod: PBBFAC,,, | Performed by: OBSTETRICS & GYNECOLOGY

## 2020-11-04 PROCEDURE — 99999 PR PBB SHADOW E&M-EST. PATIENT-LVL IV: CPT | Mod: PBBFAC,,, | Performed by: OBSTETRICS & GYNECOLOGY

## 2020-11-04 PROCEDURE — 77067 MAMMO DIGITAL SCREENING BILAT WITH TOMOSYNTHESIS_CAD: ICD-10-PCS | Mod: 26,,, | Performed by: RADIOLOGY

## 2020-11-04 PROCEDURE — 77067 SCR MAMMO BI INCL CAD: CPT | Mod: 26,,, | Performed by: RADIOLOGY

## 2020-11-04 PROCEDURE — G0101 CA SCREEN;PELVIC/BREAST EXAM: HCPCS | Mod: S$GLB,,, | Performed by: OBSTETRICS & GYNECOLOGY

## 2020-11-04 PROCEDURE — 77063 MAMMO DIGITAL SCREENING BILAT WITH TOMOSYNTHESIS_CAD: ICD-10-PCS | Mod: 26,,, | Performed by: RADIOLOGY

## 2020-11-04 PROCEDURE — 77063 BREAST TOMOSYNTHESIS BI: CPT | Mod: 26,,, | Performed by: RADIOLOGY

## 2020-11-04 PROCEDURE — 77067 SCR MAMMO BI INCL CAD: CPT | Mod: TC,PN

## 2020-11-04 PROCEDURE — G0101 PR CA SCREEN;PELVIC/BREAST EXAM: ICD-10-PCS | Mod: S$GLB,,, | Performed by: OBSTETRICS & GYNECOLOGY

## 2020-11-04 RX ORDER — OXYBUTYNIN CHLORIDE 5 MG/1
5 TABLET ORAL 2 TIMES DAILY
Qty: 60 TABLET | Refills: 11 | Status: SHIPPED | OUTPATIENT
Start: 2020-11-04 | End: 2020-11-12 | Stop reason: SDUPTHER

## 2020-11-04 RX ORDER — ESTRADIOL 2 MG/1
2 TABLET ORAL DAILY
Qty: 90 TABLET | Refills: 3 | Status: SHIPPED | OUTPATIENT
Start: 2020-11-04 | End: 2020-11-12 | Stop reason: SDUPTHER

## 2020-11-04 NOTE — TELEPHONE ENCOUNTER
"Per pt."she will finish the myrbetirq and then start the oxybutnin."    She wanted to keep the NP aware of the change and will call and surya an appt when she is finished with all medications.   "

## 2020-11-04 NOTE — TELEPHONE ENCOUNTER
----- Message from Clif Estrella sent at 11/4/2020  2:47 PM CST -----  Regarding: call back  Type:  Needs Medical Advice    Who Called: patient   Reason: patient would like to know if she should continue take the rest of her medication mirabegron (MYRBETRIQ) 25 mg Tb24 ER tablet   Would the patient rather a call back or a response via [facility]? Call back   Best Call Back Number: [phone]  Additional Information: n/a

## 2020-11-04 NOTE — PROGRESS NOTES
Subjective:       Patient ID: Madhavi Berg is a 65 y.o. female.    Chief Complaint:  Annual Exam ( Last Annual 19)      History of Present Illness.  Madhavi Berg is a 65 y.o. female.  She has no breast or urinary symptoms.  She has no postcoital bleeding, pelvic pain or vaginal discharge.  She fell and had to have repeat knee replacement.   Urologist started her on Myrbetriq for urgnency but too expensive    Current HRT Regimen:  Estardiol 2 mg    GYN & OB History  No LMP recorded (lmp unknown). Patient has had a hysterectomy.   Pap: No result found  Mammogram: 19 Birads 1  Colonoscopy: 29 polyp  DEXA: 17 Normal  PCP:  Dr. Ely  Routine Labs:       OB History    Para Term  AB Living   3 3 3     3   SAB TAB Ectopic Multiple Live Births           3      # Outcome Date GA Lbr Rex/2nd Weight Sex Delivery Anes PTL Lv   3 Term 81 40w0d  4.082 kg (9 lb) M Vag-Spont EPI  NOY   2 Term 06/10/77 40w0d  3.856 kg (8 lb 8 oz) M Vag-Spont EPI  NOY   1 Term 73 40w0d  3.969 kg (8 lb 12 oz) M Vag-Spont Gen  NOY      Obstetric Comments   Age at menarche 15       Past Medical History:   Diagnosis Date    Breast cyst     Esophageal reflux     Fibrocystic breast disease     History of anxiety disorder     History of bone density study 2017    Osteopenia  (7-30-1, Osteopenia)     Hyperlipidemia     IBS (irritable bowel syndrome)     Osteopenia      Past Surgical History:   Procedure Laterality Date    APPENDECTOMY      BREAST BIOPSY Bilateral     1 right breast; 2 left breast    BREAST CYST ASPIRATION      BREAST CYST EXCISION      COLONOSCOPY      Diverticulitis  ( Dr Breaux - Q 3yrs)    HYSTERECTOMY  1981    [facility] - 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    OOPHORECTOMY Right 1981    ORTHOPEDIC SURGERY  6438-1192    right foot x 1 and left foot x 3    THYROIDECTOMY, PARTIAL  1985    Dr Alfie Lennon    TONSILLECTOMY      9years old     Family  History   Problem Relation Age of Onset    Throat cancer Father     Hypertension Father     Cancer Father     Colon cancer Mother     Cancer Mother     Lung cancer Sister     Cancer Sister     Breast cancer Neg Hx     Ovarian cancer Neg Hx     Stroke Neg Hx      Social History     Tobacco Use    Smoking status: Former Smoker     Quit date:      Years since quittin.8    Smokeless tobacco: Never Used   Substance Use Topics    Alcohol use: Yes     Comment: occasional    Drug use: No       Current Outpatient Medications:     acetaminophen (TYLENOL) 500 MG tablet, Take by mouth., Disp: , Rfl:     atorvastatin (LIPITOR) 20 MG tablet, Take 20 mg by mouth every evening., Disp: , Rfl: 0    budesonide-formoterol 160-4.5 mcg (SYMBICORT) 160-4.5 mcg/actuation HFAA, Symbicort 160 mcg-4.5 mcg/actuation HFA aerosol inhaler, Disp: , Rfl:     ergocalciferol, vitamin D2, (VITAMIN D ORAL), Vitamin D, Disp: , Rfl:     escitalopram oxalate (LEXAPRO) 10 MG tablet, Take 10 mg by mouth every evening., Disp: , Rfl: 0    estradioL (ESTRACE) 2 MG tablet, Take 1 tablet (2 mg total) by mouth once daily. You are due for an annual exam.  Please call to schedule an appointment.  Thanks!, Disp: 90 tablet, Rfl: 3    furosemide (LASIX) 20 MG tablet, , Disp: , Rfl: 0    levothyroxine (SYNTHROID) 125 MCG tablet, Take 125 mcg by mouth once daily., Disp: , Rfl: 0    lorazepam (ATIVAN) 1 MG tablet, take 1/2 to 1 tablet by mouth at bedtime if needed, Disp: , Rfl: 0    losartan (COZAAR) 50 MG tablet, losartan 50 mg tablet, Disp: , Rfl:     montelukast (SINGULAIR) 10 mg tablet, Take 10 mg by mouth once daily., Disp: , Rfl: 0    multivit-min-FA-Ca carb-vit K (ONE-A-DAY WOMEN'S 50 PLUS) 400 mcg-500 mg calcium-20 mcg Tab, , Disp: , Rfl:     pantoprazole (PROTONIX) 40 MG tablet, Take 40 mg by mouth once daily., Disp: , Rfl: 0    potassium chloride (MICRO-K) 10 MEQ CpSR, , Disp: , Rfl: 0    trazodone (DESYREL) 50 MG tablet,  "take 1 to 2 tablets by mouth at bedtime, Disp: , Rfl: 0    oxybutynin (DITROPAN) 5 MG Tab, Take 1 tablet (5 mg total) by mouth 2 (two) times daily., Disp: 60 tablet, Rfl: 11    Review of patient's allergies indicates:   Allergen Reactions    Iodine Hives and Rash    Sulfa (sulfonamide antibiotics) Hives and Rash       Review of Systems  Review of Systems   Constitutional: Negative for fatigue.   HENT: Negative for trouble swallowing.    Eyes: Negative for visual disturbance.   Respiratory: Negative for cough and shortness of breath.    Cardiovascular: Negative for chest pain.   Gastrointestinal: Negative for abdominal distention, abdominal pain, blood in stool, nausea and vomiting.   Genitourinary: Negative for difficulty urinating, dyspareunia, dysuria, flank pain, frequency, hematuria, pelvic pain, urgency, vaginal bleeding, vaginal discharge and vaginal pain.   Musculoskeletal: Negative for arthralgias.   Skin: Negative for rash.   Neurological: Negative for dizziness and headaches.   Psychiatric/Behavioral: Negative for sleep disturbance. The patient is not nervous/anxious.         Objective:     Vitals:    11/04/20 1412   BP: 124/84   Weight: 126.5 kg (278 lb 12.4 oz)   Height: 5' 6" (1.676 m)   PainSc: 0-No pain     Body mass index is 44.99 kg/m².    Physical Exam:   Constitutional: She is oriented to person, place, and time. She appears well-developed and well-nourished.    HENT:   Head: Normocephalic.     Neck: Normal range of motion. No thyromegaly present.     Pulmonary/Chest: Right breast exhibits no mass, no nipple discharge, no skin change, no tenderness and no swelling. Left breast exhibits no mass, no nipple discharge, no skin change, no tenderness and no swelling. Breasts are symmetrical.        Abdominal: Soft. Normal appearance and bowel sounds are normal. She exhibits no distension. There is no abdominal tenderness.     Genitourinary:    Vagina and rectum normal.   Rectum:      Guaiac result " negative.   Guaiac negative stool.    Pelvic exam was performed with patient supine.   There is no rash, tenderness, lesion or injury on the right labia. There is no rash, tenderness, lesion or injury on the left labia. Uterus is absent. Right adnexum displays no mass, no tenderness and no fullness. Left adnexum displays no mass, no tenderness and no fullness. No erythema in the vagina. Cervix exhibits absence. negative for vaginal discharge          Musculoskeletal: Normal range of motion.      Lymphadenopathy:        Right: No supraclavicular adenopathy present.        Left: No supraclavicular adenopathy present.    Neurological: She is alert and oriented to person, place, and time.    Skin: Skin is warm and dry.    Psychiatric: She has a normal mood and affect.        Assessment/ Plan:     Encounter for gynecological examination    Visit for screening mammogram  -     Mammo Digital Screening [place] [place]; Future; Expected date: 11/04/2020    Menopause  -     estradioL (ESTRACE) 2 MG tablet; Take 1 tablet (2 mg total) by mouth once daily. You are due for an annual exam.  Please call to schedule an appointment.  Thanks!  Dispense: 90 tablet; Refill: 3    BMI 40.0-44.9, adult  -     Ambulatory referral/consult to Weight Management Program; Future; Expected date: 11/11/2020    Urgency of urination  -     oxybutynin (DITROPAN) 5 MG Tab; Take 1 tablet (5 mg total) by mouth 2 (two) times daily.  Dispense: 60 tablet; Refill: 11        Routine pap smears.  Self breast exam and mammography discussed  Routine colonoscopy discussed.  Diet and exercise discussed.  Recommend calcium 1200 mg and vitamin D3 1000 IU daily and routine bone mineral density testing.  Yearly influenza vaccination discussed.  Follow-up with me in 1 year

## 2020-11-06 ENCOUNTER — TELEPHONE (OUTPATIENT)
Dept: OBSTETRICS AND GYNECOLOGY | Facility: CLINIC | Age: 65
End: 2020-11-06

## 2020-11-12 ENCOUNTER — TELEPHONE (OUTPATIENT)
Dept: OBSTETRICS AND GYNECOLOGY | Facility: CLINIC | Age: 65
End: 2020-11-12

## 2020-11-12 DIAGNOSIS — Z78.0 MENOPAUSE: ICD-10-CM

## 2020-11-12 DIAGNOSIS — R39.15 URGENCY OF URINATION: ICD-10-CM

## 2020-11-12 RX ORDER — OXYBUTYNIN CHLORIDE 5 MG/1
5 TABLET ORAL 2 TIMES DAILY
Qty: 60 TABLET | Refills: 11 | Status: SHIPPED | OUTPATIENT
Start: 2020-11-12 | End: 2023-02-08

## 2020-11-12 RX ORDER — ESTRADIOL 2 MG/1
2 TABLET ORAL DAILY
Qty: 90 TABLET | Refills: 3 | Status: SHIPPED | OUTPATIENT
Start: 2020-11-12 | End: 2021-11-30 | Stop reason: SDUPTHER

## 2020-11-12 NOTE — TELEPHONE ENCOUNTER
Called pt on 11/12 @ 818am per Dr. Siegel    Pt did not answer. VM was personalized- left VM stating that mmg was normal and to call office at [phone] with any questions

## 2020-11-12 NOTE — TELEPHONE ENCOUNTER
----- Message from Queta Siegel MD sent at 11/5/2020  2:10 PM CST -----  Call patient.  Mammogram normal.  We will repeat it in 1 year.

## 2021-11-30 DIAGNOSIS — Z78.0 MENOPAUSE: ICD-10-CM

## 2021-11-30 RX ORDER — ESTRADIOL 2 MG/1
2 TABLET ORAL DAILY
Qty: 90 TABLET | Refills: 0 | Status: SHIPPED | OUTPATIENT
Start: 2021-11-30 | End: 2022-02-08 | Stop reason: SDUPTHER

## 2021-12-09 ENCOUNTER — APPOINTMENT (OUTPATIENT)
Dept: RADIOLOGY | Facility: OTHER | Age: 66
End: 2021-12-09
Attending: OBSTETRICS & GYNECOLOGY
Payer: MEDICARE

## 2021-12-09 VITALS — BODY MASS INDEX: 44.82 KG/M2 | HEIGHT: 66 IN | WEIGHT: 278.88 LBS

## 2021-12-09 DIAGNOSIS — Z12.31 VISIT FOR SCREENING MAMMOGRAM: ICD-10-CM

## 2021-12-09 PROCEDURE — 77067 SCR MAMMO BI INCL CAD: CPT | Mod: 26,,, | Performed by: RADIOLOGY

## 2021-12-09 PROCEDURE — 77067 MAMMO DIGITAL SCREENING BILAT WITH TOMO: ICD-10-PCS | Mod: 26,,, | Performed by: RADIOLOGY

## 2021-12-09 PROCEDURE — 77063 MAMMO DIGITAL SCREENING BILAT WITH TOMO: ICD-10-PCS | Mod: 26,,, | Performed by: RADIOLOGY

## 2021-12-09 PROCEDURE — 77063 BREAST TOMOSYNTHESIS BI: CPT | Mod: 26,,, | Performed by: RADIOLOGY

## 2021-12-09 PROCEDURE — 77067 SCR MAMMO BI INCL CAD: CPT | Mod: TC,PN

## 2021-12-14 ENCOUNTER — TELEPHONE (OUTPATIENT)
Dept: OBSTETRICS AND GYNECOLOGY | Facility: CLINIC | Age: 66
End: 2021-12-14
Payer: MEDICARE

## 2022-02-08 ENCOUNTER — OFFICE VISIT (OUTPATIENT)
Dept: OBSTETRICS AND GYNECOLOGY | Facility: CLINIC | Age: 67
End: 2022-02-08
Attending: OBSTETRICS & GYNECOLOGY
Payer: MEDICARE

## 2022-02-08 VITALS
WEIGHT: 292.13 LBS | SYSTOLIC BLOOD PRESSURE: 138 MMHG | BODY MASS INDEX: 46.95 KG/M2 | HEIGHT: 66 IN | DIASTOLIC BLOOD PRESSURE: 78 MMHG

## 2022-02-08 DIAGNOSIS — Z78.0 MENOPAUSE: ICD-10-CM

## 2022-02-08 DIAGNOSIS — Z00.00 PERIODIC HEALTH ASSESSMENT, GENERAL SCREENING, ADULT: Primary | ICD-10-CM

## 2022-02-08 DIAGNOSIS — Z01.419 ENCOUNTER FOR GYNECOLOGICAL EXAMINATION: ICD-10-CM

## 2022-02-08 PROCEDURE — 1126F PR PAIN SEVERITY QUANTIFIED, NO PAIN PRESENT: ICD-10-PCS | Mod: CPTII,S$GLB,, | Performed by: OBSTETRICS & GYNECOLOGY

## 2022-02-08 PROCEDURE — 1159F MED LIST DOCD IN RCRD: CPT | Mod: CPTII,S$GLB,, | Performed by: OBSTETRICS & GYNECOLOGY

## 2022-02-08 PROCEDURE — 3008F BODY MASS INDEX DOCD: CPT | Mod: CPTII,S$GLB,, | Performed by: OBSTETRICS & GYNECOLOGY

## 2022-02-08 PROCEDURE — 4010F ACE/ARB THERAPY RXD/TAKEN: CPT | Mod: CPTII,S$GLB,, | Performed by: OBSTETRICS & GYNECOLOGY

## 2022-02-08 PROCEDURE — 1101F PT FALLS ASSESS-DOCD LE1/YR: CPT | Mod: CPTII,S$GLB,, | Performed by: OBSTETRICS & GYNECOLOGY

## 2022-02-08 PROCEDURE — 3288F FALL RISK ASSESSMENT DOCD: CPT | Mod: CPTII,S$GLB,, | Performed by: OBSTETRICS & GYNECOLOGY

## 2022-02-08 PROCEDURE — 4010F PR ACE/ARB THEARPY RXD/TAKEN: ICD-10-PCS | Mod: CPTII,S$GLB,, | Performed by: OBSTETRICS & GYNECOLOGY

## 2022-02-08 PROCEDURE — 3288F PR FALLS RISK ASSESSMENT DOCUMENTED: ICD-10-PCS | Mod: CPTII,S$GLB,, | Performed by: OBSTETRICS & GYNECOLOGY

## 2022-02-08 PROCEDURE — G0101 PR CA SCREEN;PELVIC/BREAST EXAM: ICD-10-PCS | Mod: S$GLB,,, | Performed by: OBSTETRICS & GYNECOLOGY

## 2022-02-08 PROCEDURE — 3075F SYST BP GE 130 - 139MM HG: CPT | Mod: CPTII,S$GLB,, | Performed by: OBSTETRICS & GYNECOLOGY

## 2022-02-08 PROCEDURE — 3078F DIAST BP <80 MM HG: CPT | Mod: CPTII,S$GLB,, | Performed by: OBSTETRICS & GYNECOLOGY

## 2022-02-08 PROCEDURE — 99999 PR PBB SHADOW E&M-EST. PATIENT-LVL IV: CPT | Mod: PBBFAC,,, | Performed by: OBSTETRICS & GYNECOLOGY

## 2022-02-08 PROCEDURE — 99999 PR PBB SHADOW E&M-EST. PATIENT-LVL IV: ICD-10-PCS | Mod: PBBFAC,,, | Performed by: OBSTETRICS & GYNECOLOGY

## 2022-02-08 PROCEDURE — 1159F PR MEDICATION LIST DOCUMENTED IN MEDICAL RECORD: ICD-10-PCS | Mod: CPTII,S$GLB,, | Performed by: OBSTETRICS & GYNECOLOGY

## 2022-02-08 PROCEDURE — 1126F AMNT PAIN NOTED NONE PRSNT: CPT | Mod: CPTII,S$GLB,, | Performed by: OBSTETRICS & GYNECOLOGY

## 2022-02-08 PROCEDURE — 3008F PR BODY MASS INDEX (BMI) DOCUMENTED: ICD-10-PCS | Mod: CPTII,S$GLB,, | Performed by: OBSTETRICS & GYNECOLOGY

## 2022-02-08 PROCEDURE — G0101 CA SCREEN;PELVIC/BREAST EXAM: HCPCS | Mod: S$GLB,,, | Performed by: OBSTETRICS & GYNECOLOGY

## 2022-02-08 PROCEDURE — 1101F PR PT FALLS ASSESS DOC 0-1 FALLS W/OUT INJ PAST YR: ICD-10-PCS | Mod: CPTII,S$GLB,, | Performed by: OBSTETRICS & GYNECOLOGY

## 2022-02-08 PROCEDURE — 3078F PR MOST RECENT DIASTOLIC BLOOD PRESSURE < 80 MM HG: ICD-10-PCS | Mod: CPTII,S$GLB,, | Performed by: OBSTETRICS & GYNECOLOGY

## 2022-02-08 PROCEDURE — 3075F PR MOST RECENT SYSTOLIC BLOOD PRESS GE 130-139MM HG: ICD-10-PCS | Mod: CPTII,S$GLB,, | Performed by: OBSTETRICS & GYNECOLOGY

## 2022-02-08 RX ORDER — PROMETHAZINE HYDROCHLORIDE AND CODEINE PHOSPHATE 6.25; 1 MG/5ML; MG/5ML
5 SOLUTION ORAL EVERY 6 HOURS PRN
COMMUNITY
Start: 2022-01-18

## 2022-02-08 RX ORDER — TRAMADOL HYDROCHLORIDE 50 MG/1
TABLET ORAL
COMMUNITY

## 2022-02-08 RX ORDER — TRAZODONE HYDROCHLORIDE 100 MG/1
TABLET ORAL
COMMUNITY
Start: 2022-02-03

## 2022-02-08 RX ORDER — ATORVASTATIN CALCIUM 40 MG/1
40 TABLET, FILM COATED ORAL DAILY
COMMUNITY
Start: 2022-02-03

## 2022-02-08 RX ORDER — ESTRADIOL 2 MG/1
2 TABLET ORAL DAILY
Qty: 90 TABLET | Refills: 0 | Status: SHIPPED | OUTPATIENT
Start: 2022-02-08 | End: 2022-02-08

## 2022-02-08 RX ORDER — ESCITALOPRAM OXALATE 20 MG/1
TABLET ORAL
COMMUNITY

## 2022-02-08 RX ORDER — TRAMADOL HYDROCHLORIDE 50 MG/1
50 TABLET ORAL EVERY 12 HOURS PRN
COMMUNITY
Start: 2021-12-01

## 2022-02-08 RX ORDER — DICYCLOMINE HYDROCHLORIDE 20 MG/1
TABLET ORAL
COMMUNITY

## 2022-02-08 RX ORDER — CELECOXIB 200 MG/1
CAPSULE ORAL
COMMUNITY

## 2022-02-08 RX ORDER — ONDANSETRON 4 MG/1
TABLET, ORALLY DISINTEGRATING ORAL
COMMUNITY

## 2022-02-08 RX ORDER — ALBUTEROL SULFATE 90 UG/1
AEROSOL, METERED RESPIRATORY (INHALATION)
COMMUNITY
Start: 2021-10-21

## 2022-02-08 RX ORDER — FLUTICASONE PROPIONATE AND SALMETEROL 250; 50 UG/1; UG/1
POWDER RESPIRATORY (INHALATION)
COMMUNITY
Start: 2022-01-25 | End: 2023-02-08

## 2022-02-08 RX ORDER — MUPIROCIN 20 MG/G
OINTMENT TOPICAL
COMMUNITY
End: 2023-02-08

## 2022-02-08 RX ORDER — LIOTHYRONINE SODIUM 5 UG/1
TABLET ORAL
COMMUNITY
Start: 2022-01-19

## 2022-02-08 RX ORDER — GABAPENTIN 300 MG/1
CAPSULE ORAL
COMMUNITY

## 2022-02-08 RX ORDER — AMOXICILLIN 500 MG
CAPSULE ORAL DAILY
COMMUNITY

## 2022-02-08 RX ORDER — ESTRADIOL 2 MG/1
2 TABLET ORAL DAILY
Qty: 90 TABLET | Refills: 3 | Status: SHIPPED | OUTPATIENT
Start: 2022-02-08 | End: 2022-06-06

## 2022-02-08 NOTE — PROGRESS NOTES
Patient, Madhavi Berg (MRN #[id number]), presented with a recorded BMI of 47.15 kg/m^2 consistent with the definition of morbid obesity (ICD-10 E66.01). The patient's morbid obesity was monitored, evaluated, addressed and/or treated. This addendum to the medical record is made on 02/08/2022.

## 2022-02-08 NOTE — PROGRESS NOTES
Subjective:       Patient ID: Madhavi Berg is a 66 y.o. female.    Chief Complaint:  Well Woman (TVH/bso Mammo 21 birads 1 Dexa 19 normal colonoscopy  )      History of Present Illness.  Madhavi Berg is a 66 y.o. female.  She has no breast or urinary symptoms.  She has no postcoital bleeding, pelvic pain or vaginal discharge.      Current HRT Regimen:  Estradiol 2 mg PO QAM    GYN & OB History  No LMP recorded (lmp unknown). Patient has had a hysterectomy.   Pap: No result found  Mammogram: 21 Birads 1  Colonoscopy:  Normal  DEXA: 17 Normal  PCP:  Dr. Ely    OB History    Para Term  AB Living   3 3 3     3   SAB IAB Ectopic Multiple Live Births           3      # Outcome Date GA Lbr Rex/2nd Weight Sex Delivery Anes PTL Lv   3 Term 81 40w0d  4.082 kg (9 lb) M Vag-Spont EPI  NOY   2 Term 06/10/77 40w0d  3.856 kg (8 lb 8 oz) M Vag-Spont EPI  NOY   1 Term 73 40w0d  3.969 kg (8 lb 12 oz) M Vag-Spont Gen  NOY      Obstetric Comments   Age at menarche 15       Past Medical History:   Diagnosis Date    Breast cyst     Esophageal reflux     Fibrocystic breast disease     History of anxiety disorder     History of bone density study 2017    Osteopenia  (7-30-1, Osteopenia)     Hyperlipidemia     IBS (irritable bowel syndrome)     Osteopenia      Past Surgical History:   Procedure Laterality Date    APPENDECTOMY      BREAST BIOPSY Bilateral     1 right breast; 2 left breast    BREAST CYST ASPIRATION      BREAST CYST EXCISION      COLONOSCOPY      Diverticulitis  ( Dr Breaux - Q 3yrs)    HYSTERECTOMY  1981    [facility] - 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    OOPHORECTOMY Right 1981    ORTHOPEDIC SURGERY  0434-9184    right foot x 1 and left foot x 3    THYROIDECTOMY, PARTIAL  1985    Dr Alfie Lennon    TONSILLECTOMY      9years old     Family History   Problem Relation Age of Onset    Throat cancer Father     Hypertension Father      Cancer Father     Colon cancer Mother     Cancer Mother     Lung cancer Sister     Cancer Sister     Breast cancer Neg Hx     Ovarian cancer Neg Hx     Stroke Neg Hx      Social History     Tobacco Use    Smoking status: Former Smoker     Quit date:      Years since quittin.1    Smokeless tobacco: Never Used   Substance Use Topics    Alcohol use: Yes     Comment: occasional    Drug use: No       Current Outpatient Medications:     acetaminophen (TYLENOL) 500 MG tablet, Take by mouth., Disp: , Rfl:     albuterol (PROVENTIL/VENTOLIN HFA) 90 mcg/actuation inhaler, SMARTSI Puff(s) By Mouth Every 6 Hours PRN, Disp: , Rfl:     atorvastatin (LIPITOR) 40 MG tablet, Take 40 mg by mouth once daily., Disp: , Rfl:     budesonide-formoterol 160-4.5 mcg (SYMBICORT) 160-4.5 mcg/actuation HFAA, Symbicort 160 mcg-4.5 mcg/actuation HFA aerosol inhaler, Disp: , Rfl:     celecoxib (CELEBREX) 200 MG capsule, celecoxib 200 mg capsule  TAKE 1 CAPSULE BY MOUTH TWICE DAILY AS NEEDED, Disp: , Rfl:     dicyclomine (BENTYL) 20 mg tablet, dicyclomine 20 mg tablet  TAKE 1 TABLET BY MOUTH TWICE DAILY AS NEEDED FOR PAIN, Disp: , Rfl:     ergocalciferol, vitamin D2, (VITAMIN D ORAL), Vitamin D, Disp: , Rfl:     EScitalopram oxalate (LEXAPRO) 20 MG tablet, escitalopram 20 mg tablet  TAKE 1 TABLET BY MOUTH EVERY DAY, Disp: , Rfl:     furosemide (LASIX) 20 MG tablet, , Disp: , Rfl: 0    gabapentin (NEURONTIN) 300 MG capsule, gabapentin 300 mg capsule, Disp: , Rfl:     levothyroxine (SYNTHROID) 125 MCG tablet, Take 125 mcg by mouth once daily., Disp: , Rfl: 0    liothyronine (CYTOMEL) 5 MCG Tab, TAKE 1 TABLET BY MOUTH EVERY DAY ON AN EMPTY STOMACH, Disp: , Rfl:     lorazepam (ATIVAN) 1 MG tablet, take 1/2 to 1 tablet by mouth at bedtime if needed, Disp: , Rfl: 0    montelukast (SINGULAIR) 10 mg tablet, Take 10 mg by mouth once daily., Disp: , Rfl: 0    mupirocin (BACTROBAN) 2 % ointment, mupirocin 2 % topical  ointment, Disp: , Rfl:     mv-min-folic-calcium carb-K1 400 mcg-500 mg calcium-20 mcg Tab, , Disp: , Rfl:     omega-3 fatty acids/fish oil (FISH OIL-OMEGA-3 FATTY ACIDS) 300-1,000 mg capsule, Take by mouth once daily., Disp: , Rfl:     ondansetron (ZOFRAN-ODT) 4 MG TbDL, ondansetron 4 mg disintegrating tablet  DISSOLVE 1 TABLET ON THE TONGUE EVERY 6 HOURS AS NEEDED FOR NAUSEA, Disp: , Rfl:     pantoprazole (PROTONIX) 40 MG tablet, Take 40 mg by mouth once daily., Disp: , Rfl: 0    potassium chloride (MICRO-K) 10 MEQ CpSR, , Disp: , Rfl: 0    promethazine-codeine 6.25-10 mg/5 ml (PHENERGAN WITH CODEINE) 6.25-10 mg/5 mL syrup, Take 5 mLs by mouth every 6 (six) hours as needed. AS NEEDED FOR COUGH, Disp: , Rfl:     traMADoL (ULTRAM) 50 mg tablet, Take 50 mg by mouth every 12 (twelve) hours as needed., Disp: , Rfl:     traMADoL (ULTRAM) 50 mg tablet, tramadol 50 mg tablet  TAKE 1 TABLET BY MOUTH EVERY 4 HOURS AS NEEDED FOR 7 DAYS, Disp: , Rfl:     traZODone (DESYREL) 100 MG tablet, TAKE ONE OR TWO TABLETS BY MOUTH AT BEDTIME, Disp: , Rfl:     WIXELA INHUB 250-50 mcg/dose diskus inhaler, INHALE 1 PUFF BY MOUTH TWICE DAILY. RINSE MOUTH AND THROAT AFTER USE, Disp: , Rfl:     estradioL (ESTRACE) 2 MG tablet, Take 1 tablet (2 mg total) by mouth once daily., Disp: 90 tablet, Rfl: 3    oxybutynin (DITROPAN) 5 MG Tab, Take 1 tablet (5 mg total) by mouth 2 (two) times daily., Disp: 60 tablet, Rfl: 11    Review of patient's allergies indicates:   Allergen Reactions    Adhesive tape-silicones      Other reaction(s): Hives    Sulfur      Other reaction(s): nausea and vomiting    Iodine Hives and Rash     Other reaction(s): hives    Sulfa (sulfonamide antibiotics) Hives and Rash       Review of Systems  Review of Systems   Constitutional: Negative for fatigue.   HENT: Negative for trouble swallowing.    Eyes: Negative for visual disturbance.   Respiratory: Negative for cough and shortness of breath.   "  Cardiovascular: Negative for chest pain.   Gastrointestinal: Negative for abdominal distention, abdominal pain, blood in stool, nausea and vomiting.   Genitourinary: Negative for difficulty urinating, dyspareunia, dysuria, flank pain, frequency, hematuria, pelvic pain, urgency, vaginal bleeding, vaginal discharge and vaginal pain.   Musculoskeletal: Negative for arthralgias.   Skin: Negative for rash.   Neurological: Negative for dizziness and headaches.   Psychiatric/Behavioral: Negative for sleep disturbance. The patient is not nervous/anxious.         Objective:     Vitals:    02/08/22 1356   BP: 138/78   Weight: 132.5 kg (292 lb 1.8 oz)   Height: 5' 6" (1.676 m)   PainSc: 0-No pain     Body mass index is 47.15 kg/m².    Physical Exam:   Constitutional: She is oriented to person, place, and time. Vital signs are normal. She appears well-developed and well-nourished.    HENT:   Head: Normocephalic.     Neck: No thyromegaly present.     Pulmonary/Chest: Right breast exhibits no mass, no nipple discharge, no skin change, no tenderness and no swelling. Left breast exhibits no mass, no nipple discharge, no skin change, no tenderness and no swelling. Breasts are symmetrical.        Abdominal: Soft. Normal appearance and bowel sounds are normal. She exhibits no distension. There is no abdominal tenderness.     Genitourinary:    Vagina and rectum normal.   Rectum:      Guaiac result negative.   Guaiac negative stool.    Pelvic exam was performed with patient supine.   There is no rash, tenderness, lesion or injury on the right labia. There is no rash, tenderness, lesion or injury on the left labia. Right adnexum displays no mass, no tenderness and no fullness. Left adnexum displays no mass, no tenderness and no fullness. No erythema or  no vaginal discharge in the vagina. Cervix is absent.Uterus is absent.           Musculoskeletal: Normal range of motion.      Lymphadenopathy:        Right: No inguinal and no " supraclavicular adenopathy present.        Left: No inguinal and no supraclavicular adenopathy present.    Neurological: She is alert and oriented to person, place, and time.    Skin: Skin is warm and dry.    Psychiatric: She has a normal mood and affect.        Assessment/ Plan:     Periodic health assessment, general screening, adult  -     Hepatic Function Panel; Future; Expected date: 02/08/2022    Menopause  -     Discontinue: estradioL (ESTRACE) 2 MG tablet; Take 1 tablet (2 mg total) by mouth once daily.  Dispense: 90 tablet; Refill: 0  -     estradioL (ESTRACE) 2 MG tablet; Take 1 tablet (2 mg total) by mouth once daily.  Dispense: 90 tablet; Refill: 3  -     DXA Bone Density Spine And Hip; Future; Expected date: 02/08/2022    Encounter for gynecological examination        Routine pap smears.  Self breast exam and mammography discussed  Routine colonoscopy discussed.  Diet and exercise discussed.  Recommend routine bone mineral density testing.  Yearly influenza vaccination discussed.  Follow-up with me in 1 year

## 2022-03-08 ENCOUNTER — APPOINTMENT (OUTPATIENT)
Dept: RADIOLOGY | Facility: CLINIC | Age: 67
End: 2022-03-08
Attending: OBSTETRICS & GYNECOLOGY
Payer: MEDICARE

## 2022-03-08 DIAGNOSIS — Z78.0 MENOPAUSE: ICD-10-CM

## 2022-03-08 PROCEDURE — 77080 DEXA BONE DENSITY SPINE HIP: ICD-10-PCS | Mod: 26,,, | Performed by: INTERNAL MEDICINE

## 2022-03-08 PROCEDURE — 77080 DXA BONE DENSITY AXIAL: CPT | Mod: TC,PO

## 2022-03-08 PROCEDURE — 77080 DXA BONE DENSITY AXIAL: CPT | Mod: 26,,, | Performed by: INTERNAL MEDICINE

## 2022-08-24 ENCOUNTER — TELEPHONE (OUTPATIENT)
Dept: OBSTETRICS AND GYNECOLOGY | Facility: CLINIC | Age: 67
End: 2022-08-24
Payer: MEDICARE

## 2022-08-24 NOTE — TELEPHONE ENCOUNTER
Pt was called an informed of new address of dr ramirez on [facility] in [place]     Pt will call new office in October to see if dr ramirez is on [employer] [address]

## 2022-08-24 NOTE — TELEPHONE ENCOUNTER
----- Message from Gladis Serna sent at 8/22/2022 11:40 AM CDT -----  Name of Who is Calling: DEBIBE COLORADO [[id number]]           What is the request in detail: Patient is requesting a call back.  Patient received a letter stating Dr. Siegel was moving to [facility].  Please assist.           Can the clinic reply by [facility]: No           What Number to Call Back [facility] not [facility]: [phone]

## 2023-01-30 ENCOUNTER — TELEPHONE (OUTPATIENT)
Dept: OBSTETRICS AND GYNECOLOGY | Facility: CLINIC | Age: 68
End: 2023-01-30
Payer: MEDICARE

## 2023-01-30 DIAGNOSIS — Z12.31 BREAST CANCER SCREENING BY MAMMOGRAM: Primary | ICD-10-CM

## 2023-02-08 ENCOUNTER — HOSPITAL ENCOUNTER (OUTPATIENT)
Dept: RADIOLOGY | Facility: HOSPITAL | Age: 68
Discharge: HOME | End: 2023-02-08
Attending: STUDENT IN AN ORGANIZED HEALTH CARE EDUCATION/TRAINING PROGRAM
Payer: MEDICARE

## 2023-02-08 ENCOUNTER — OFFICE VISIT (OUTPATIENT)
Dept: OBSTETRICS AND GYNECOLOGY | Facility: CLINIC | Age: 68
End: 2023-02-08
Payer: MEDICARE

## 2023-02-08 VITALS — HEIGHT: 66 IN | WEIGHT: 292 LBS | BODY MASS INDEX: 46.93 KG/M2

## 2023-02-08 VITALS
WEIGHT: 292.75 LBS | BODY MASS INDEX: 47.05 KG/M2 | HEIGHT: 66 IN | DIASTOLIC BLOOD PRESSURE: 81 MMHG | SYSTOLIC BLOOD PRESSURE: 129 MMHG

## 2023-02-08 DIAGNOSIS — Z12.31 BREAST CANCER SCREENING BY MAMMOGRAM: ICD-10-CM

## 2023-02-08 DIAGNOSIS — R68.82 LOW LIBIDO: ICD-10-CM

## 2023-02-08 DIAGNOSIS — Z01.419 ENCOUNTER FOR GYNECOLOGICAL EXAMINATION: Primary | ICD-10-CM

## 2023-02-08 PROCEDURE — 1126F AMNT PAIN NOTED NONE PRSNT: CPT | Mod: CPTII,S$GLB,, | Performed by: STUDENT IN AN ORGANIZED HEALTH CARE EDUCATION/TRAINING PROGRAM

## 2023-02-08 PROCEDURE — 77067 SCR MAMMO BI INCL CAD: CPT | Mod: TC

## 2023-02-08 PROCEDURE — 99999 PR PBB SHADOW E&M-EST. PATIENT-LVL II: ICD-10-PCS | Mod: PBBFAC,,, | Performed by: STUDENT IN AN ORGANIZED HEALTH CARE EDUCATION/TRAINING PROGRAM

## 2023-02-08 PROCEDURE — 77067 MAMMO DIGITAL SCREENING BILAT WITH TOMO: ICD-10-PCS | Mod: 26,,, | Performed by: RADIOLOGY

## 2023-02-08 PROCEDURE — 1159F PR MEDICATION LIST DOCUMENTED IN MEDICAL RECORD: ICD-10-PCS | Mod: CPTII,S$GLB,, | Performed by: STUDENT IN AN ORGANIZED HEALTH CARE EDUCATION/TRAINING PROGRAM

## 2023-02-08 PROCEDURE — 99999 PR PBB SHADOW E&M-EST. PATIENT-LVL II: CPT | Mod: PBBFAC,,, | Performed by: STUDENT IN AN ORGANIZED HEALTH CARE EDUCATION/TRAINING PROGRAM

## 2023-02-08 PROCEDURE — 3008F BODY MASS INDEX DOCD: CPT | Mod: CPTII,S$GLB,, | Performed by: STUDENT IN AN ORGANIZED HEALTH CARE EDUCATION/TRAINING PROGRAM

## 2023-02-08 PROCEDURE — 3008F PR BODY MASS INDEX (BMI) DOCUMENTED: ICD-10-PCS | Mod: CPTII,S$GLB,, | Performed by: STUDENT IN AN ORGANIZED HEALTH CARE EDUCATION/TRAINING PROGRAM

## 2023-02-08 PROCEDURE — 1160F PR REVIEW ALL MEDS BY PRESCRIBER/CLIN PHARMACIST DOCUMENTED: ICD-10-PCS | Mod: CPTII,S$GLB,, | Performed by: STUDENT IN AN ORGANIZED HEALTH CARE EDUCATION/TRAINING PROGRAM

## 2023-02-08 PROCEDURE — 3079F DIAST BP 80-89 MM HG: CPT | Mod: CPTII,S$GLB,, | Performed by: STUDENT IN AN ORGANIZED HEALTH CARE EDUCATION/TRAINING PROGRAM

## 2023-02-08 PROCEDURE — 3079F PR MOST RECENT DIASTOLIC BLOOD PRESSURE 80-89 MM HG: ICD-10-PCS | Mod: CPTII,S$GLB,, | Performed by: STUDENT IN AN ORGANIZED HEALTH CARE EDUCATION/TRAINING PROGRAM

## 2023-02-08 PROCEDURE — 1101F PR PT FALLS ASSESS DOC 0-1 FALLS W/OUT INJ PAST YR: ICD-10-PCS | Mod: CPTII,S$GLB,, | Performed by: STUDENT IN AN ORGANIZED HEALTH CARE EDUCATION/TRAINING PROGRAM

## 2023-02-08 PROCEDURE — 1101F PT FALLS ASSESS-DOCD LE1/YR: CPT | Mod: CPTII,S$GLB,, | Performed by: STUDENT IN AN ORGANIZED HEALTH CARE EDUCATION/TRAINING PROGRAM

## 2023-02-08 PROCEDURE — 3288F PR FALLS RISK ASSESSMENT DOCUMENTED: ICD-10-PCS | Mod: CPTII,S$GLB,, | Performed by: STUDENT IN AN ORGANIZED HEALTH CARE EDUCATION/TRAINING PROGRAM

## 2023-02-08 PROCEDURE — 1160F RVW MEDS BY RX/DR IN RCRD: CPT | Mod: CPTII,S$GLB,, | Performed by: STUDENT IN AN ORGANIZED HEALTH CARE EDUCATION/TRAINING PROGRAM

## 2023-02-08 PROCEDURE — 77063 MAMMO DIGITAL SCREENING BILAT WITH TOMO: ICD-10-PCS | Mod: 26,,, | Performed by: RADIOLOGY

## 2023-02-08 PROCEDURE — 3074F SYST BP LT 130 MM HG: CPT | Mod: CPTII,S$GLB,, | Performed by: STUDENT IN AN ORGANIZED HEALTH CARE EDUCATION/TRAINING PROGRAM

## 2023-02-08 PROCEDURE — 4010F PR ACE/ARB THEARPY RXD/TAKEN: ICD-10-PCS | Mod: CPTII,S$GLB,, | Performed by: STUDENT IN AN ORGANIZED HEALTH CARE EDUCATION/TRAINING PROGRAM

## 2023-02-08 PROCEDURE — 3074F PR MOST RECENT SYSTOLIC BLOOD PRESSURE < 130 MM HG: ICD-10-PCS | Mod: CPTII,S$GLB,, | Performed by: STUDENT IN AN ORGANIZED HEALTH CARE EDUCATION/TRAINING PROGRAM

## 2023-02-08 PROCEDURE — 77063 BREAST TOMOSYNTHESIS BI: CPT | Mod: 26,,, | Performed by: RADIOLOGY

## 2023-02-08 PROCEDURE — 1126F PR PAIN SEVERITY QUANTIFIED, NO PAIN PRESENT: ICD-10-PCS | Mod: CPTII,S$GLB,, | Performed by: STUDENT IN AN ORGANIZED HEALTH CARE EDUCATION/TRAINING PROGRAM

## 2023-02-08 PROCEDURE — 77067 SCR MAMMO BI INCL CAD: CPT | Mod: 26,,, | Performed by: RADIOLOGY

## 2023-02-08 PROCEDURE — 4010F ACE/ARB THERAPY RXD/TAKEN: CPT | Mod: CPTII,S$GLB,, | Performed by: STUDENT IN AN ORGANIZED HEALTH CARE EDUCATION/TRAINING PROGRAM

## 2023-02-08 PROCEDURE — 1159F MED LIST DOCD IN RCRD: CPT | Mod: CPTII,S$GLB,, | Performed by: STUDENT IN AN ORGANIZED HEALTH CARE EDUCATION/TRAINING PROGRAM

## 2023-02-08 PROCEDURE — G0101 CA SCREEN;PELVIC/BREAST EXAM: HCPCS | Mod: GZ,S$GLB,, | Performed by: STUDENT IN AN ORGANIZED HEALTH CARE EDUCATION/TRAINING PROGRAM

## 2023-02-08 PROCEDURE — G0101 PR CA SCREEN;PELVIC/BREAST EXAM: ICD-10-PCS | Mod: GZ,S$GLB,, | Performed by: STUDENT IN AN ORGANIZED HEALTH CARE EDUCATION/TRAINING PROGRAM

## 2023-02-08 PROCEDURE — 3288F FALL RISK ASSESSMENT DOCD: CPT | Mod: CPTII,S$GLB,, | Performed by: STUDENT IN AN ORGANIZED HEALTH CARE EDUCATION/TRAINING PROGRAM

## 2023-02-08 RX ORDER — LOSARTAN POTASSIUM 50 MG/1
TABLET ORAL
COMMUNITY

## 2023-02-08 RX ORDER — NYSTATIN 100000 [USP'U]/G
POWDER TOPICAL
COMMUNITY
Start: 2022-08-02

## 2023-02-08 RX ORDER — PHENTERMINE AND TOPIRAMATE 3.75; 23 MG/1; MG/1
CAPSULE, EXTENDED RELEASE ORAL
COMMUNITY
Start: 2022-10-06

## 2023-02-08 RX ORDER — CHOLECALCIFEROL (VITAMIN D3) 125 MCG
CAPSULE ORAL
COMMUNITY

## 2023-02-14 ENCOUNTER — TELEPHONE (OUTPATIENT)
Dept: OBSTETRICS AND GYNECOLOGY | Facility: CLINIC | Age: 68
End: 2023-02-14
Payer: MEDICARE

## 2023-02-14 NOTE — TELEPHONE ENCOUNTER
Called and spoke to patient.  Patient was seen for her Well Woman, she was formerly established with Dr. Siegel. She had her annual exam with you on back on 2/8. Patient says that you were supposed to send in a cream for her, she also mentioned that [facility] will not carry it since it a combination of two medicines I assume. Patient ask if you could send over to [facility]?    Please advise.

## 2023-02-14 NOTE — TELEPHONE ENCOUNTER
Called patient. Confirmed identity. Let her know that testosterone 2% cream has been called into [facility] discount pharmacy. Also discussed normal mammogram result. She voiced no further questions.    Barbara Stewart MD  Obstetrics and Gynecology  [facility]

## 2023-02-15 NOTE — PROGRESS NOTES
Chief Complaint: Well Woman Exam     HPI:      Madhavi Berg is a 67 y.o.  who presents today for well woman exam. Prior care with Dr. Siegel. Patient has had a hysterectomy. Doing well on HRT, desires to continue. Does c/o low libido. Also taking finasteride for hair loss prescribed by Dermatology - she feels it is working. Specifically, patient denies vaginal bleeding, discharge, pelvic pain, urinary problems, or changes in appetite. Ms. Berg is currently sexually active with a single male partner-.    HRT: estrace 2 mg PO daily  Previous Pap: hyst  Previous Mammogram: BiRads: 1 T-C Score: 4% (21)   Most Recent Dexa: WNL (), Repeat in   Most Recent Colonoscopy: Benign polyps (), Repeat in     Past Medical History:   Diagnosis Date    Breast cyst     Esophageal reflux     Fibrocystic breast disease     History of anxiety disorder     History of bone density study 2017    Osteopenia  (7-30-1, Osteopenia)     Hyperlipidemia     IBS (irritable bowel syndrome)     Osteopenia      Past Surgical History:   Procedure Laterality Date    APPENDECTOMY  1981    BREAST BIOPSY Bilateral     1 right breast; 2 left breast    BREAST CYST ASPIRATION      BREAST CYST EXCISION      COLONOSCOPY  2015    Diverticulitis  ( Dr Breaux - Q 3yrs)    HYSTERECTOMY  1981    [facility] - 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    OOPHORECTOMY Right 1981    ORTHOPEDIC SURGERY  3367-4170    right foot x 1 and left foot x 3    THYROIDECTOMY, PARTIAL  1985    Dr Alfie Lennon    TONSILLECTOMY      9years old     Social History     Socioeconomic History    Marital status:    Tobacco Use    Smoking status: Former     Types: Cigarettes     Quit date:      Years since quittin.1    Smokeless tobacco: Never   Substance and Sexual Activity    Alcohol use: Yes     Comment: occasional    Drug use: No    Sexual activity: Not Currently     Partners: Male     Birth control/protection: Surgical      "Comment: :      Family History   Problem Relation Age of Onset    Throat cancer Father     Hypertension Father     Cancer Father     Colon cancer Mother     Cancer Mother     Lung cancer Sister     Cancer Sister     Breast cancer Neg Hx     Ovarian cancer Neg Hx     Stroke Neg Hx      Review of patient's allergies indicates:   Allergen Reactions    Adhesive tape-silicones Other (See Comments)     Other reaction(s): Hives  Other reaction(s): Hives    Sulfur      Other reaction(s): nausea and vomiting    Iodine Hives and Rash     Other reaction(s): hives    Sulfa (sulfonamide antibiotics) Hives and Rash     OB History          3    Para   3    Term   3            AB        Living   3         SAB        IAB        Ectopic        Multiple        Live Births   3           Obstetric Comments   Age at menarche 15             Physical Exam:      PHYSICAL EXAM:  /81   Ht 5' 6" (1.676 m)   Wt 132.8 kg (292 lb 12.3 oz)   LMP  (LMP Unknown) Comment: TV     BMI 47.25 kg/m²   Body mass index is 47.25 kg/m².     APPEARANCE: Well nourished, well developed, in no acute distress.  PSYCH: Appropriate mood and affect.  SKIN: No acne or hirsutism  NECK: Neck symmetric without masses  NODES: No inguinal or axillary, lymph node enlargement  ABDOMEN: Soft.  No tenderness or masses.    CARDIOVASCULAR: No edema of peripheral extremities  BREASTS: Symmetrical, no visible skin lesions. No palpable masses. No nipple discharge bilaterally.  PELVIC: Normal external genitalia without lesions.  Normal hair distribution.  Adequate perineal body, normal urethral meatus.  Vagina moist and smooth, atrophy. Without lesions. Without discharge.  Normal appearing vaginal cuff.  No significant cystocele or rectocele.  Bimanual exam shows uterus to be normal size, regular, mobile and nontender.  Adnexa without masses or tenderness.      Assessment/Plan:     Encounter for gynecological examination    Low libido    - pelvic " exam normal  - MMG ordered for screening  - colonoscopy due in 2024  - DXA normal  - discussed R/B/A continuing HRT, desires to continue, will inform us when she needs refill  - discussed options for low libido, had previously spoken with Dr Siegel about testosterone 2% cream and is now interested in starting this, will call in to compound pharmacy, risks and side effects were discussed and she voiced understanding  - routine labs with PCP    Follow up in about 1 year (around 2/8/2024) for annual exam.    Addendum:  BMI 47 addressed with patient. Discussed dietary/lifestyle changes. She also plans to address with PCP.    Counseling:     Patient was counseled today on current ASCCP pap guidelines, the recommendation for yearly physical exams, safe driving habits, breast self awareness and annual mammograms. She is to see her PCP for other health maintenance.     Use of the [facility] Patient Portal discussed and encouraged during today's visit.       Barbara Stewart MD  Obstetrics and Gynecology  [facility]

## 2024-02-15 DIAGNOSIS — Z78.0 MENOPAUSE: ICD-10-CM

## 2024-02-15 RX ORDER — ESTRADIOL 2 MG/1
2 TABLET ORAL DAILY
Qty: 60 TABLET | Refills: 0 | Status: SHIPPED | OUTPATIENT
Start: 2024-02-15 | End: 2024-04-18

## 2024-04-18 DIAGNOSIS — Z78.0 MENOPAUSE: ICD-10-CM

## 2024-04-18 RX ORDER — ESTRADIOL 2 MG/1
2 TABLET ORAL
Qty: 60 TABLET | Refills: 0 | Status: SHIPPED | OUTPATIENT
Start: 2024-04-18 | End: 2024-06-18

## 2024-06-18 DIAGNOSIS — Z78.0 MENOPAUSE: ICD-10-CM

## 2024-06-18 RX ORDER — ESTRADIOL 2 MG/1
2 TABLET ORAL
Qty: 60 TABLET | Refills: 0 | Status: SHIPPED | OUTPATIENT
Start: 2024-06-18

## 2024-06-18 NOTE — TELEPHONE ENCOUNTER
Refill Routing Note   Medication(s) are not appropriate for processing by [facility] for the following reason(s):        Patient not seen by provider within 15 months  Required vitals outdated  Required labs outdated    [facility] action(s):  Defer        Medication Therapy Plan: FOVS in 2 weeks      Appointments  past 12m or future 3m with PCP    Date Provider   Last Visit   2/8/2023 Barbara Stewart MD   Next Visit   7/2/2024 Barbara Stewart MD   ED visits in past 90 days: 0        Note composed:1:51 PM 06/18/2024

## 2024-07-02 ENCOUNTER — HOSPITAL ENCOUNTER (OUTPATIENT)
Dept: RADIOLOGY | Facility: HOSPITAL | Age: 69
Discharge: HOME | End: 2024-07-02
Attending: FAMILY MEDICINE
Payer: MEDICARE

## 2024-07-02 ENCOUNTER — OFFICE VISIT (OUTPATIENT)
Dept: OBSTETRICS AND GYNECOLOGY | Facility: CLINIC | Age: 69
End: 2024-07-02
Payer: MEDICARE

## 2024-07-02 VITALS
SYSTOLIC BLOOD PRESSURE: 114 MMHG | WEIGHT: 278.69 LBS | DIASTOLIC BLOOD PRESSURE: 74 MMHG | HEIGHT: 66 IN | WEIGHT: 278 LBS | BODY MASS INDEX: 44.98 KG/M2 | BODY MASS INDEX: 44.68 KG/M2

## 2024-07-02 DIAGNOSIS — Z12.31 ENCOUNTER FOR SCREENING MAMMOGRAM FOR BREAST CANCER: ICD-10-CM

## 2024-07-02 DIAGNOSIS — Z78.0 MENOPAUSE: ICD-10-CM

## 2024-07-02 DIAGNOSIS — Z01.419 ENCOUNTER FOR GYNECOLOGICAL EXAMINATION: Primary | ICD-10-CM

## 2024-07-02 PROCEDURE — 4010F ACE/ARB THERAPY RXD/TAKEN: CPT | Mod: CPTII,S$GLB,, | Performed by: STUDENT IN AN ORGANIZED HEALTH CARE EDUCATION/TRAINING PROGRAM

## 2024-07-02 PROCEDURE — 3074F SYST BP LT 130 MM HG: CPT | Mod: CPTII,S$GLB,, | Performed by: STUDENT IN AN ORGANIZED HEALTH CARE EDUCATION/TRAINING PROGRAM

## 2024-07-02 PROCEDURE — 1126F AMNT PAIN NOTED NONE PRSNT: CPT | Mod: CPTII,S$GLB,, | Performed by: STUDENT IN AN ORGANIZED HEALTH CARE EDUCATION/TRAINING PROGRAM

## 2024-07-02 PROCEDURE — 1101F PT FALLS ASSESS-DOCD LE1/YR: CPT | Mod: CPTII,S$GLB,, | Performed by: STUDENT IN AN ORGANIZED HEALTH CARE EDUCATION/TRAINING PROGRAM

## 2024-07-02 PROCEDURE — 1159F MED LIST DOCD IN RCRD: CPT | Mod: CPTII,S$GLB,, | Performed by: STUDENT IN AN ORGANIZED HEALTH CARE EDUCATION/TRAINING PROGRAM

## 2024-07-02 PROCEDURE — 99397 PER PM REEVAL EST PAT 65+ YR: CPT | Mod: S$GLB,,, | Performed by: STUDENT IN AN ORGANIZED HEALTH CARE EDUCATION/TRAINING PROGRAM

## 2024-07-02 PROCEDURE — 77067 SCR MAMMO BI INCL CAD: CPT | Mod: TC

## 2024-07-02 PROCEDURE — 3288F FALL RISK ASSESSMENT DOCD: CPT | Mod: CPTII,S$GLB,, | Performed by: STUDENT IN AN ORGANIZED HEALTH CARE EDUCATION/TRAINING PROGRAM

## 2024-07-02 PROCEDURE — 99999 PR PBB SHADOW E&M-EST. PATIENT-LVL II: CPT | Mod: PBBFAC,,, | Performed by: STUDENT IN AN ORGANIZED HEALTH CARE EDUCATION/TRAINING PROGRAM

## 2024-07-02 PROCEDURE — 1160F RVW MEDS BY RX/DR IN RCRD: CPT | Mod: CPTII,S$GLB,, | Performed by: STUDENT IN AN ORGANIZED HEALTH CARE EDUCATION/TRAINING PROGRAM

## 2024-07-02 PROCEDURE — 3008F BODY MASS INDEX DOCD: CPT | Mod: CPTII,S$GLB,, | Performed by: STUDENT IN AN ORGANIZED HEALTH CARE EDUCATION/TRAINING PROGRAM

## 2024-07-02 PROCEDURE — 3078F DIAST BP <80 MM HG: CPT | Mod: CPTII,S$GLB,, | Performed by: STUDENT IN AN ORGANIZED HEALTH CARE EDUCATION/TRAINING PROGRAM

## 2024-07-02 RX ORDER — ESTRADIOL 2 MG/1
2 TABLET ORAL DAILY
Qty: 60 TABLET | Status: CANCELLED | OUTPATIENT
Start: 2024-07-02

## 2024-07-02 RX ORDER — MIRABEGRON 8 MG/8ML
GRANULE, FOR SUSPENSION, EXTENDED RELEASE ORAL
COMMUNITY

## 2024-08-12 DIAGNOSIS — Z78.0 MENOPAUSE: ICD-10-CM

## 2024-08-12 RX ORDER — ESTRADIOL 2 MG/1
2 TABLET ORAL
Qty: 90 TABLET | Refills: 3 | Status: SHIPPED | OUTPATIENT
Start: 2024-08-12

## 2024-08-12 NOTE — TELEPHONE ENCOUNTER
Refill Decision Note   Madhavi Berg  is requesting a refill authorization.  Brief Assessment and Rationale for Refill:  Approve     Medication Therapy Plan:         Comments:     Note composed:4:36 PM 08/12/2024

## 2025-01-29 DIAGNOSIS — Z78.0 MENOPAUSE: ICD-10-CM

## 2025-01-29 RX ORDER — ESTRADIOL 2 MG/1
2 TABLET ORAL
Qty: 90 TABLET | Refills: 1 | Status: SHIPPED | OUTPATIENT
Start: 2025-01-29

## 2025-01-29 NOTE — TELEPHONE ENCOUNTER
Refill Decision Note   Madhavi Berg  is requesting a refill authorization.  Brief Assessment and Rationale for Refill:  Approve     Medication Therapy Plan:         Comments:     Note composed:3:31 PM 01/29/2025

## 2025-07-31 ENCOUNTER — HOSPITAL ENCOUNTER (OUTPATIENT)
Dept: RADIOLOGY | Facility: HOSPITAL | Age: 70
Discharge: HOME | End: 2025-07-31
Attending: ORTHOPAEDIC SURGERY
Payer: MEDICARE

## 2025-07-31 ENCOUNTER — OFFICE VISIT (OUTPATIENT)
Dept: ORTHOPEDICS | Facility: CLINIC | Age: 70
End: 2025-07-31
Payer: MEDICARE

## 2025-07-31 VITALS — HEIGHT: 66 IN | WEIGHT: 278 LBS | BODY MASS INDEX: 44.68 KG/M2

## 2025-07-31 DIAGNOSIS — Z98.890 HISTORY OF SURGERY: ICD-10-CM

## 2025-07-31 DIAGNOSIS — M96.0 NONUNION AFTER ARTHRODESIS: Primary | ICD-10-CM

## 2025-07-31 DIAGNOSIS — R52 PAIN: ICD-10-CM

## 2025-07-31 DIAGNOSIS — M21.42 PES PLANUS OF LEFT FOOT: ICD-10-CM

## 2025-07-31 DIAGNOSIS — M79.2 NEUROGENIC PAIN OF LEFT FOOT: ICD-10-CM

## 2025-07-31 PROCEDURE — 1125F AMNT PAIN NOTED PAIN PRSNT: CPT | Mod: CPTII,S$GLB,, | Performed by: ORTHOPAEDIC SURGERY

## 2025-07-31 PROCEDURE — 3288F FALL RISK ASSESSMENT DOCD: CPT | Mod: CPTII,S$GLB,, | Performed by: ORTHOPAEDIC SURGERY

## 2025-07-31 PROCEDURE — 73630 X-RAY EXAM OF FOOT: CPT | Mod: TC,LT

## 2025-07-31 PROCEDURE — 73630 X-RAY EXAM OF FOOT: CPT | Mod: 26,LT,, | Performed by: RADIOLOGY

## 2025-07-31 PROCEDURE — 1160F RVW MEDS BY RX/DR IN RCRD: CPT | Mod: CPTII,S$GLB,, | Performed by: ORTHOPAEDIC SURGERY

## 2025-07-31 PROCEDURE — 3008F BODY MASS INDEX DOCD: CPT | Mod: CPTII,S$GLB,, | Performed by: ORTHOPAEDIC SURGERY

## 2025-07-31 PROCEDURE — 1101F PT FALLS ASSESS-DOCD LE1/YR: CPT | Mod: CPTII,S$GLB,, | Performed by: ORTHOPAEDIC SURGERY

## 2025-07-31 PROCEDURE — 4010F ACE/ARB THERAPY RXD/TAKEN: CPT | Mod: CPTII,S$GLB,, | Performed by: ORTHOPAEDIC SURGERY

## 2025-07-31 PROCEDURE — 99203 OFFICE O/P NEW LOW 30 MIN: CPT | Mod: S$GLB,,, | Performed by: ORTHOPAEDIC SURGERY

## 2025-07-31 PROCEDURE — 99999 PR PBB SHADOW E&M-EST. PATIENT-LVL II: CPT | Mod: PBBFAC,,, | Performed by: ORTHOPAEDIC SURGERY

## 2025-07-31 PROCEDURE — 1159F MED LIST DOCD IN RCRD: CPT | Mod: CPTII,S$GLB,, | Performed by: ORTHOPAEDIC SURGERY

## 2025-07-31 RX ORDER — GABAPENTIN 300 MG/1
300 CAPSULE ORAL NIGHTLY
Qty: 30 CAPSULE | Refills: 0 | Status: SHIPPED | OUTPATIENT
Start: 2025-07-31 | End: 2025-08-30

## 2025-07-31 NOTE — PROGRESS NOTES
DATE: 7/31/2025  PATIENT: Madhavi Berg    CHIEF COMPLAINT: L foot pain    HISTORY:  Madhavi Berg is a 69 y.o. female hx of PMO/Akin osteotomy (2010), Lapidus reconstruction  for nonunion PMO left foot (2011), Hardware removal left foot (2012) to the left foot here for evaluation of L foot pain.     The pain has been present for 10-12 years. Patient states it is located on the sole of her foot as well as the medial and lateral aspects of the foot. There is associated numbness between first two toes. Prior treatments have included OTC NSAIDs, lidocaine, rest/ice/elevate, and brace with minimal results. She has no other complaints at this time.      PAST MEDICAL/SURGICAL HISTORY:  Past Medical History:   Diagnosis Date    Breast cyst     Esophageal reflux     Fibrocystic breast disease     History of anxiety disorder     History of bone density study 04/20/2017    Osteopenia  (7-30-1, Osteopenia)     Hyperlipidemia     IBS (irritable bowel syndrome)     Osteopenia      Past Surgical History:   Procedure Laterality Date    APPENDECTOMY  1981    BREAST BIOPSY Bilateral     1 right breast; 2 left breast    BREAST CYST ASPIRATION      BREAST CYST EXCISION      COLONOSCOPY  2015    Diverticulitis  ( Dr Breaux - Q 3yrs)    HYSTERECTOMY  1981    [facility] - 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 Negative for chills, fever and night sweats.   Cardiovascular: Negative for chest pain and syncope.   Respiratory: Negative for cough and shortness of breath.   Gastrointestinal: See HPI. Negative for nausea/vomiting. Negative for abdominal pain.  Genitourinary: See HPI. Negative for discoloration or dysuria.  Skin:  "Negative for dry skin, itching and rash.   Hematologic/Lymphatic: Negative for bleeding problem. Does not bruise/bleed easily.   Musculoskeletal: see HPI.  Neurological: See HPI. No seizures.   Endocrine: Negative for polydipsia, polyphagia and polyuria.   Allergic/Immunologic: Negative for hives and persistent infections.    PHYSICAL EXAMINATION:    Ht 5' 6" (1.676 m)   Wt 126.1 kg (278 lb)   LMP  (LMP Unknown) Comment: TVH   1981  BMI 44.87 kg/m²     General: The patient is a 69 y.o. female in no apparent distress, the patient is oriented to person, place and time.   Psych: Normal mood and affect  HEENT:  NCAT, sclera nonicteric  Lungs:  Respirations are equal and unlabored.  CV:  2+ bilateral upper and lower extremity pulses.  Skin:  Intact throughout.  Musculoskeletal: No pain with the range of motion of the bilateral hips. No trochanteric tenderness to palpation. No pain with range of motion about the bilateral knees.      Left Foot and Ankle Exam    INSPECTION:        Gait:    Antalgic   Scars:   None   Swelling:  None   Color:   Normal     ALIGNMENT:    Flatfoot deformity with hallux valgus       TENDERNESS:  LATERAL:      Sinus tarsi:  None    Syndesmosis:  None     ATFL:   None      CFL:   None     Anterolateral gutter: None    Fibula:   None   Peroneal tendons: TTP     Peroneal tubercle.  None     ANTERIOR:  Anteromedial joint line:  None   Anterolateral joint line:  None  Talonavicular:    None  Anterior tibialis:   None   Extensor tendons:   None     POSTERIOR:  Medial/lateral achilles:  None   Medial/lateral achilles insertion: None    MEDIAL:      Deltoid:  TTP     Malleolus:  None     PTT:   TTP     Navicular:  TTP            CALCANEUS:  Retrocalcaneal:   None   Medial achilles:   None  Lateral achilles:   None  Calcaneal tuberosity:   None    FOOT:    Calcaneal cuboid  None    MT / MT heads:  None   Navicular   None     Medial cord origin PF:  None   Cuneiforms:   None     Web space:   None " "  Lisfranc    None     Tarsal tunnel:   None   Base of the fifth metatarsal  None    Tinels sign   Negative  Plantar surface  TTP        RANGE OF MOTION:  RIGHT/ LEFT (* = pain)     Ankle DF/PF:  15/45  10*/40*         Eversion/Inversion: 15/25 10*/20*     Midfoot ABD/ADD: 10/10 10/10     First MTP DF/PF:  60/25 60/25               STRENGTH: (affected) (* = pain)  Anterior tibialis: 5/5  Posterior tibialis: 5/5  Gastroc-soleus: 5/5  Peroneals:  5/5  EHL:   5/5  FHL:   5/5      NEUROLOGIC TESTING:  All dermatomes foot, ankle and leg have normal sensation light touch with exception of numbness between first and second toe  Ankle Reflexes 2+, symmetric     VASCULAR:  2+ pulses PT/DT with brisk capillary refill toes.          IMAGING:   Radiographs of the L ankle were ordered and personally reviewed with the patient today, revealing flatfoot deformity, hallux valgus, and nonunion of previous fusion site with broken off implants at proximal aspect of first metatarsal and medial cuneiform.          ASSESSMENT/PLAN:    Madhavi Chapman" was seen today for pain.    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        69 y.o. yo female with hx of PMO/Akin osteotomy (2010), Lapidus reconstruction  for nonunion PMO left foot (2011), and Hardware removal left foot (2012) presents with L foot pain, likely has nonunion of prior fusion site.    Plan: The patient and I had a thorough discussion today.  We discussed the working " diagnosis as well as several other potential alternative diagnoses.  Treatment options were discussed, including risks and benefits of each.    At this time, the patient would like to proceed with:  - WBAT  - pain control: daily aleve  - Gabapentin 300mg daily  - Insoles recommended  - Quinn brace order placed  - Short boot provided today    I have personally taken the history and examined this patient and agree with the residents note as stated above.         [1]   Current Outpatient Medications:     acetaminophen (TYLENOL) 500 MG tablet, Take by mouth., Disp: , Rfl:     albuterol (PROVENTIL/VENTOLIN HFA) 90 mcg/actuation inhaler, SMARTSI Puff(s) By Mouth Every 6 Hours PRN, Disp: , Rfl:     atorvastatin (LIPITOR) 40 MG tablet, Take 40 mg by mouth once daily., Disp: , Rfl:     budesonide-formoterol 160-4.5 mcg (SYMBICORT) 160-4.5 mcg/actuation HFAA, Symbicort 160 mcg-4.5 mcg/actuation HFA aerosol inhaler, Disp: , Rfl:     celecoxib (CELEBREX) 200 MG capsule, , Disp: , Rfl:     dicyclomine (BENTYL) 20 mg tablet, dicyclomine 20 mg tablet  TAKE 1 TABLET BY MOUTH TWICE DAILY AS NEEDED FOR PAIN, Disp: , Rfl:     ergocalciferol, vitamin D2, (VITAMIN D ORAL), , Disp: , Rfl:     EScitalopram oxalate (LEXAPRO) 20 MG tablet, escitalopram 20 mg tablet  TAKE 1 TABLET BY MOUTH EVERY DAY, Disp: , Rfl:     estradioL (ESTRACE) 2 MG tablet, TAKE 1 TABLET(2 MG) BY MOUTH EVERY DAY, Disp: 90 tablet, Rfl: 1    furosemide (LASIX) 20 MG tablet, , Disp: , Rfl: 0    levothyroxine (SYNTHROID) 125 MCG tablet, Take 150 mcg by mouth once daily., Disp: , Rfl: 0    liothyronine (CYTOMEL) 5 MCG Tab, , Disp: , Rfl:     lorazepam (ATIVAN) 1 MG tablet, take 1/2 to 1 tablet by mouth at bedtime if needed, Disp: , Rfl: 0    losartan (COZAAR) 50 MG tablet, losartan 50 mg tablet  TAKE 1 TABLET BY MOUTH EVERY DAY, Disp: , Rfl:     mirabegron (MYRBETRIQ) 8 mg/mL SSRR, Take by mouth., Disp: , Rfl:     montelukast (SINGULAIR) 10 mg tablet, Take 10 mg by  mouth once daily., Disp: , Rfl: 0    mv-min-folic-calcium carb-K1 400 mcg-500 mg calcium-20 mcg Tab, , Disp: , Rfl:     naproxen sodium 220 mg Cap, , Disp: , Rfl:     nystatin (MYCOSTATIN) powder, Nystop 100,000 unit/gram topical powder  1 APPLICATION EXTERNALLY TWICE DAILY 10 DAYS, Disp: , Rfl:     omega-3 fatty acids/fish oil (FISH OIL-OMEGA-3 FATTY ACIDS) 300-1,000 mg capsule, Take by mouth once daily., Disp: , Rfl:     ondansetron (ZOFRAN-ODT) 4 MG TbDL, , Disp: , Rfl:     pantoprazole (PROTONIX) 40 MG tablet, Take 40 mg by mouth once daily., Disp: , Rfl: 0    phentermine-topiramate (QSYMIA) 3.75-23 mg CM24, , Disp: , Rfl:     potassium chloride (MICRO-K) 10 MEQ CpSR, , Disp: , Rfl: 0    promethazine-codeine 6.25-10 mg/5 ml (PHENERGAN WITH CODEINE) 6.25-10 mg/5 mL syrup, Take 5 mLs by mouth every 6 (six) hours as needed. AS NEEDED FOR COUGH, Disp: , Rfl:     traMADoL (ULTRAM) 50 mg tablet, Take 50 mg by mouth every 12 (twelve) hours as needed., Disp: , Rfl:     traMADoL (ULTRAM) 50 mg tablet, , Disp: , Rfl:     traZODone (DESYREL) 100 MG tablet, TAKE ONE OR TWO TABLETS BY MOUTH AT BEDTIME, Disp: , Rfl:     gabapentin (NEURONTIN) 300 MG capsule, Take 1 capsule (300 mg total) by mouth every evening., Disp: 30 capsule, Rfl: 0  [2]   Social History  Socioeconomic History    Marital status:    Tobacco Use    Smoking status: Former     Current packs/day: 0.00     Types: Cigarettes     Quit date:      Years since quittin.6    Smokeless tobacco: Never   Substance and Sexual Activity    Alcohol use: Yes     Comment: occasional    Drug use: No    Sexual activity: Not Currently     Partners: Male     Birth control/protection: Surgical     Comment: :

## 2025-08-05 ENCOUNTER — TELEPHONE (OUTPATIENT)
Dept: OBSTETRICS AND GYNECOLOGY | Facility: CLINIC | Age: 70
End: 2025-08-05
Payer: MEDICARE

## 2025-08-05 DIAGNOSIS — Z78.0 MENOPAUSE: ICD-10-CM

## 2025-08-05 DIAGNOSIS — Z12.31 BREAST CANCER SCREENING BY MAMMOGRAM: Primary | ICD-10-CM

## 2025-08-05 RX ORDER — ESTRADIOL 2 MG/1
2 TABLET ORAL
Qty: 90 TABLET | Refills: 2 | Status: SHIPPED | OUTPATIENT
Start: 2025-08-05

## 2025-08-05 NOTE — TELEPHONE ENCOUNTER
Refill Routing Note   Medication(s) are not appropriate for processing by [facility] for the following reason(s):        Required labs outdated  Required vitals outdated    [facility] action(s):  Defer        Medication Therapy Plan: Mammography is out of date      Appointments  past 12m or future 3m with PCP    Date Provider   Last Visit   7/2/2024 Barbara Stewart MD   Next Visit   1/6/2026 Barbara Stewart MD   ED visits in past 90 days: 0        Note composed:2:10 PM 08/05/2025